# Patient Record
Sex: MALE | Race: WHITE | Employment: FULL TIME | URBAN - METROPOLITAN AREA
[De-identification: names, ages, dates, MRNs, and addresses within clinical notes are randomized per-mention and may not be internally consistent; named-entity substitution may affect disease eponyms.]

---

## 2017-01-16 RX ORDER — INSULIN GLARGINE 100 [IU]/ML
INJECTION, SOLUTION SUBCUTANEOUS
Qty: 30 ML | Refills: 0 | Status: SHIPPED | OUTPATIENT
Start: 2017-01-16 | End: 2017-03-15

## 2017-03-10 ENCOUNTER — OFFICE VISIT (OUTPATIENT)
Dept: ENDOCRINOLOGY | Facility: CLINIC | Age: 47
End: 2017-03-10
Payer: COMMERCIAL

## 2017-03-10 VITALS
DIASTOLIC BLOOD PRESSURE: 96 MMHG | HEIGHT: 68 IN | BODY MASS INDEX: 25.4 KG/M2 | WEIGHT: 167.6 LBS | SYSTOLIC BLOOD PRESSURE: 144 MMHG | HEART RATE: 93 BPM

## 2017-03-10 DIAGNOSIS — E10.65 TYPE 1 DIABETES MELLITUS WITH HYPERGLYCEMIA (H): Primary | ICD-10-CM

## 2017-03-10 DIAGNOSIS — E10.9 DIABETES MELLITUS TYPE 1 (H): ICD-10-CM

## 2017-03-10 LAB — HBA1C MFR BLD: 9.1 % (ref 0–5.7)

## 2017-03-10 PROCEDURE — 83036 HEMOGLOBIN GLYCOSYLATED A1C: CPT | Performed by: INTERNAL MEDICINE

## 2017-03-10 PROCEDURE — 36415 COLL VENOUS BLD VENIPUNCTURE: CPT | Performed by: INTERNAL MEDICINE

## 2017-03-10 PROCEDURE — 99214 OFFICE O/P EST MOD 30 MIN: CPT | Performed by: INTERNAL MEDICINE

## 2017-03-10 NOTE — LETTER
3/10/2017      RE: Carlos A Roberts  [address]  [facility] [address]       Endocrinology Clinic Visit    March 10, 2017    Chief Complaint: Diabetes       Subjective:         HPI: Carlos A Roberts is a 46 year old year old Male with history of  has a past medical history of Hyperlipidemia; Other and unspecified hyperlipidemia; Other psoriasis; Tobacco use disorder; and Type I (juvenile type) diabetes mellitus without mention of complication, not stated as uncontrolled. who is seen in  In follow up     Prior patient of Dr Gonzales     History of Diabetes  Type 1  Diagnosed in about 12 years ago. At the time he had polyuria and polydipsia.       Complications    1. Retinopathy: No Last eye exam was nearly a year ago.     2.  Nephropathy:   Lab Results   Component Value Date    MICROL 16 07/25/2016     3. Neuropathy None    4. Hypoglycemia once a month or less.     5. Macrovascular: No      Treatment  Diabetes Medication(s)     Insulin Sig    insulin glargine (LANTUS VIAL) 100 UNIT/ML injection 40 units in the monring    insulin lispro (HUMALOG KWIKPEN) 100 UNIT/ML soln Use as directed for carbohydrate and sliding scale coverage.  Max daily dose 50 units daily.        Lantus 39 units daily   Humalog 1 per 15 with 1 per 40 correction over 140    Prevention  Lipid  LDL Cholesterol Calculated   Date Value Ref Range Status   07/25/2016 156 (H) <100 mg/dL Final     Comment:     Above desirable:  100-129 mg/dl   Borderline High:  130-159 mg/dL   High:             160-189 mg/dL   Very high:       >189 mg/dl     07/22/2015 113 0 - 129 mg/dL Final     Comment:     LDL Cholesterol is the primary guide to therapy: LDL-cholesterol goal in high   risk patients is <100 mg/dL and in very high risk patients is <70 mg/dL.         Medications     HMG CoA Reductase Inhibitors    atorvastatin (LIPITOR) 80 MG tablet    Salicylates    ASPIRIN 81 MG OR TABS          Renal  Medication (Note: This includes the hypertensive combination  subclass to make sure to show all ACEI/ARB's.)     ACE Inhibitors Sig    LISINOPRIL 5 MG OR TABS ONE DAILY            Weight  Wt Readings from Last 4 Encounters:   03/10/17 76 kg (167 lb 9.6 oz)   11/28/16 75.7 kg (166 lb 14.2 oz)   07/25/16 77.4 kg (170 lb 11.2 oz)   03/28/16 78.7 kg (173 lb 8 oz)       Meter Download Summary: No meter    Diet: Does not check before meals.   Meals are erratic because of work schedule.     Exercise None    Weight trend  Wt Readings from Last 4 Encounters:   03/10/17 76 kg (167 lb 9.6 oz)   11/28/16 75.7 kg (166 lb 14.2 oz)   07/25/16 77.4 kg (170 lb 11.2 oz)   03/28/16 78.7 kg (173 lb 8 oz)       A1c today is   Lab Results   Component Value Date    A1C 9.1 03/10/2017    A1C 9.2 11/28/2016    A1C 8.8 07/25/2016    A1C 7.8 03/28/2016    A1C 7.6 01/14/2016       Barriers to achieve glycemic goals:   1. High carb diet.   2. Lack of testing  - has not checked in 6 weeks. Meter cleared by cleaning crew in the office.   3. Erratic eating schedule, does not check before meals.       Hypothyroidism  2015 : Graves disease - weight loss, sensation of warms.   WHITING ablation done in 2015  Started on LT4  Doing well  No symptoms of warmth or cold.   No change in weight or appetite. BM normal    No neck pain or problems swallowing.   No mood change -- recently  but feeling OK.          Allergies   Allergen Reactions     Penicillins        Current Outpatient Prescriptions   Medication Sig Dispense Refill     levothyroxine (SYNTHROID/LEVOTHROID) 100 MCG tablet Take 1 tablet (100 mcg) by mouth daily 90 tablet 1     insulin glargine (LANTUS VIAL) 100 UNIT/ML injection 40 units in the monring 30 mL 0     blood glucose monitoring (ACCU-CHEK JANNETH) test strip Use to test blood sugars 4 times daily or as directed. 150 each 11     insulin lispro (HUMALOG KWIKPEN) 100 UNIT/ML soln Use as directed for carbohydrate and sliding scale coverage.  Max daily dose 50 units daily. 15 mL 3     PARoxetine  "(PAXIL) 10 MG tablet Take 1 tablet (10 mg) by mouth every morning 30 tablet 0     atorvastatin (LIPITOR) 80 MG tablet Take 40 mg by mouth daily        LISINOPRIL 5 MG OR TABS ONE DAILY 1 month 0     BD INSULIN SYR ULTRAFINE II 31G X 5/16\" 0.5 ML MISC as directed 1 box prn     ASPIRIN 81 MG OR TABS ONE DAILY         Review of Systems     Constitutional: Negative for fever and unexpected weight change. Appetite Normal   Head: no headache   Eye: no vision change/ loss of peripheral vision.   ENT: No throat congestion.   Respiratory: no cough   Cardiovascular: no chest pain or tachycardia  Gastrointestinal: Negative for vomiting, abdominal pain and diarrhea.  Genitourinary: No bladder problems.  Musculoskeletal: Negative for myalgias. No weakness.  Neurological: Negative for seizures and headaches.  Psychiatric/Behavioral: Negative for behavioral problem and dysphoric mood.    Past Medical History   Diagnosis Date     Hyperlipidemia      Other and unspecified hyperlipidemia      Other psoriasis      Tobacco use disorder      Type I (juvenile type) diabetes mellitus without mention of complication, not stated as uncontrolled      Insulin dependent       Past Surgical History   Procedure Laterality Date     Appendectomy  Age 10       Family History   Problem Relation Age of Onset     HEART DISEASE Paternal Grandmother      HEART DISEASE Paternal Grandfather        Social History     Social History     Marital status:      Spouse name: N/A     Number of children: N/A     Years of education: N/A     Social History Main Topics     Smoking status: Former Smoker     Quit date: 11/25/2007     Smokeless tobacco: Not on file     Alcohol use Yes     Drug use: No     Sexual activity: Not on file     Other Topics Concern     Not on file     Social History Narrative       Objective:   BP (!) 144/96  Pulse 93  Ht 1.727 m (5' 8\")  Wt 76 kg (167 lb 9.6 oz)  BMI 25.48 kg/m2  Constitutional: Appears well-developed and " well-nourished. Active.   EYES: anicteric, normal extra-ocular movements, no lid lag or retraction   HEENT: Mouth/Throat: Mucous membrane is moist. Oropharynx is clear. No adenopathy. Normal thyroid   Cardiovascular: RRR, S1, S2 normal. No m/g/r   Pulmonary/Chest: CTAB. No wheezing or rales   Abdominal: +BS. Nontender to palpation. No organomegaly present.  Neurological: Alert. Normal affect. CNII-XII intact. Muscle strength 5/5. Sensory is intact.  Extremities: No clubbing, cyanosis or inflammation   Skin: normal texture, color  Lymphatic: no cervical lymphadenopathy.  Psychological: appropriate mood     In House Labs:   Lab Results   Component Value Date    A1C 9.1 03/10/2017    A1C 9.2 11/28/2016    A1C 8.8 07/25/2016    A1C 7.8 03/28/2016    A1C 7.6 01/14/2016       TSH   Date Value Ref Range Status   11/28/2016 2.89 0.40 - 4.00 mU/L Final   07/25/2016 7.92 (H) 0.40 - 4.00 mU/L Final   03/28/2016 2.70 0.40 - 4.00 mU/L Final   02/24/2016 3.50 0.40 - 4.00 mU/L Final   01/14/2016 5.52 (H) 0.40 - 4.00 mU/L Final     T4 Free   Date Value Ref Range Status   11/28/2016 1.20 0.76 - 1.46 ng/dL Final   07/25/2016 1.10 0.76 - 1.46 ng/dL Final   03/28/2016 1.13 0.76 - 1.46 ng/dL Final   02/24/2016 1.01 0.76 - 1.46 ng/dL Final   01/14/2016 1.03 0.76 - 1.46 ng/dL Final       Creatinine   Date Value Ref Range Status   07/25/2016 0.80 0.66 - 1.25 mg/dL Final   ]    Recent Labs   Lab Test  07/25/16   0714  07/22/15   0740 05/22/14   CHOL  253*  180  164   HDL  61  52  40   LDL  156*  113  107   TRIG  179*  76  83   CHOLHDLRATIO   --   3.5  4.10       No results found for: [id number], [id number], [id number]      Assessment/Treatment Plan:      Carlos A Roberts is a 46 year old year old male with    1. Type 1 Diabetes:    A1c is 9.1 today.  There is no evidence of chronic diabetic complications.  On statin and ARB      Barriers to achieving glycemic goals  1. High carb diet. Diet discussed.   2. Lack of testing  - has not checked  in 6 weeks. Meter cleared by cleaning crew in the office.   3. Erratic eating schedule, does not check before meals.   4. Basal BOLUS Mismatch  Humalog 1 per 10 gm carb (but may be lower than this - calculated 6.25 gm, correction calculated 1 per 25)   FU with Blanca for PUMP start.     Hypothyroidism S/P WHITING ablation  Continue LT4 100 mcg daily.       Patient Instructions   Thank you for choosing the [facility], Department of Endocrinology. It has been a pleasure servicing you today. Please contact us at [phone] with any questions. If you need to speak to an Endocrinologist and it is after 5:00 pm or on a weekend, please call the On-Call Endocrinologist at [phone].      I will contact the patient with the test results.  Return to clinic in 4 months. 2 month with Blanca for pump.     Lab a week before next visit.     Tramaine Hahn MD  [id number]  Endocrinology Service    Test and/or medications prescribed today:  Orders Placed This Encounter   Procedures     Hemoglobin A1c POCT                 Tramaine Hahn MD

## 2017-03-10 NOTE — MR AVS SNAPSHOT
After Visit Summary   3/10/2017    Carlos A Roberts    MRN: [id number]           Patient Information     Date Of Birth          1970        Visit Information        Provider Department      3/10/2017 3:20 PM Tramaine Hahn MD;  ENDO NURSE [facility]        Today's Diagnoses     Diabetes mellitus type 1 (H)    -  1      Care Instructions    Please have a follow up with Blanca and discuss about various pumps options    Continue Lantus 39 units daily    Take Humalog 1 unit per 10 gm for meals and snacks.     Correction: 1 unit per 40 mg /dl rise above 140     PLEASE test the blood sugars before meals.     Follow up 4 months. Labs prior to follow up       Thank you for choosing the [facility], Department of Endocrinology. It has been a pleasure servicing you today. Please contact us at [phone] with any questions. If you need to speak to an Endocrinologist and it is after 5:00 pm or on a weekend, please call the On-Call Endocrinologist at [phone].          Follow-ups after your visit        Follow-up notes from your care team     Return in about 4 months (around 7/10/2017).      Your next 10 appointments already scheduled     Apr 21, 2017  3:00 PM CDT   Return Visit with  Cde Provider   [facility] ([facility])    [address] [facility] [address] [phone]            Jul 14, 2017  3:00 PM CDT   LAB with LAB FIRST FLOOR [facility])    [id number] [facility] [address] [phone]           Patient must bring picture ID.  Patient should be prepared to give a urine specimen  Please do not eat 10-12 hours before your appointment if you are coming in fasting for labs on lipids, cholesterol, or glucose (sugar).  Pregnant women should follow their Care Team instructions. Water with medications is okay. Do not drink coffee  or other fluids.   If you have concerns about taking  your medications, please ask at office or if scheduling via [employer], send a message by clicking on Secure Messaging, Message Your Care Team.            Jul 21, 2017  3:15 PM CDT   Return Visit with Tramaine Hahn MD, MG ENDO NURSE   [facility] ([facility])    [address] [place] [address] [phone]              Future tests that were ordered for you today     Open Standing Orders        Priority Remaining Interval Expires Ordered    Hemoglobin A1c POCT Routine 4/5  3/10/2018 3/10/2017            Who to contact     If you have questions or need follow up information about today's clinic visit or your schedule please contact [facility] directly at [phone].  Normal or non-critical lab and imaging results will be communicated to you by [employer], letter or phone within 4 business days after the clinic has received the results. If you do not hear from us within 7 days, please contact the clinic through [employer] or phone. If you have a critical or abnormal lab result, we will notify you by phone as soon as possible.  Submit refill requests through [employer] or call your pharmacy and they will forward the refill request to us. Please allow 3 business days for your refill to be completed.          Additional Information About Your Visit        Sparks Information     Sparks gives you secure access to your electronic health record. If you see a primary care provider, you can also send messages to your care team and make appointments. If you have questions, please call your primary care clinic.  If you do not have a primary care provider, please call [phone] and they will assist you.      S[employer]ks is an electronic gateway that provides easy, online access to your medical records. With [employer], you can request a clinic appointment, read your test results, renew a prescription or  "communicate with your care team.     To access your existing account, please contact your [facility] or call [phone] for assistance.        Care EveryWhere ID     This is your Care EveryWhere ID. This could be used by other organizations to access your [place] medical records  [id number]        Your Vitals Were     Pulse Height BMI (Body Mass Index)             93 1.727 m (5' 8\") 25.48 kg/m2          Blood Pressure from Last 3 Encounters:   03/10/17 (!) 144/96   11/28/16 129/82   07/25/16 104/76    Weight from Last 3 Encounters:   03/10/17 76 kg (167 lb 9.6 oz)   11/28/16 75.7 kg (166 lb 14.2 oz)   07/25/16 77.4 kg (170 lb 11.2 oz)               Primary Care Provider Office Phone # Fax #    Sean Barron [phone]       [address] [place]  [facility] [address]        Thank you!     Thank you for choosing [facility]  for your care. Our goal is always to provide you with excellent care. Hearing back from our patients is one way we can continue to improve our services. Please take a few minutes to complete the written survey that you may receive in the mail after your visit with us. Thank you!             Your Updated Medication List - Protect others around you: Learn how to safely use, store and throw away your medicines at www.disposemymeds.org.          This list is accurate as of: 3/10/17  4:14 PM.  Always use your most recent med list.                   Brand Name Dispense Instructions for use    aspirin 81 MG tablet      ONE DAILY       atorvastatin 80 MG tablet    LIPITOR     Take 40 mg by mouth daily       B-D INSULIN SYRINGE 31G X 5/16\" 0.5 ML   Generic drug:  insulin syringe-needle U-100     1 box    as directed       blood glucose monitoring test strip    ACCU-CHEK JANNETH    150 each    Use to test blood sugars 4 times daily or as directed.       insulin glargine 100 UNIT/ML injection    LANTUS VIAL    30 mL    40 " units in the monring       insulin lispro 100 UNIT/ML injection    HumaLOG KWIKpen    15 mL    Use as directed for carbohydrate and sliding scale coverage.  Max daily dose 50 units daily.       levothyroxine 100 MCG tablet    SYNTHROID/LEVOTHROID    90 tablet    Take 1 tablet (100 mcg) by mouth daily       lisinopril 5 MG tablet    PRINIVIL/ZESTRIL    1 month    ONE DAILY       PARoxetine 10 MG tablet    PAXIL    30 tablet    Take 1 tablet (10 mg) by mouth every morning

## 2017-03-10 NOTE — PATIENT INSTRUCTIONS
Please have a follow up with Blanca and discuss about various pumps options    Continue Lantus 39 units daily    Take Humalog 1 unit per 10 gm for meals and snacks.     Correction: 1 unit per 40 mg /dl rise above 140     PLEASE test the blood sugars before meals.     Follow up 4 months. Labs prior to follow up       Thank you for choosing the [facility], Department of Endocrinology. It has been a pleasure servicing you today. Please contact us at [phone] with any questions. If you need to speak to an Endocrinologist and it is after 5:00 pm or on a weekend, please call the On-Call Endocrinologist at [phone].

## 2017-03-15 ENCOUNTER — TELEPHONE (OUTPATIENT)
Dept: ENDOCRINOLOGY | Facility: CLINIC | Age: 47
End: 2017-03-15

## 2017-03-15 DIAGNOSIS — E10.65 TYPE 1 DIABETES MELLITUS WITH HYPERGLYCEMIA (H): Primary | ICD-10-CM

## 2017-03-15 RX ORDER — INSULIN GLARGINE 100 [IU]/ML
39 INJECTION, SOLUTION SUBCUTANEOUS DAILY
Qty: 30 ML | Refills: 1 | Status: SHIPPED | OUTPATIENT
Start: 2017-03-15 | End: 2017-06-23

## 2017-03-15 NOTE — TELEPHONE ENCOUNTER
[facility] Call Center    Phone Message    Name of Caller: KENNEDY PAEZ    Phone Number: Cell number on file:    Telephone Information:   Mobile [phone]       Best time to return call: TODAY.     May a detailed message be left on voicemail: no    Relation to patient: Self    Reason for Call: Other: Pt's insurance is not covering insulin glargine (LANTUS VIAL) 100 UNIT/ML injection.  They gave him an alternative and he is wondering if it is ok to use.  Please call back.      Action Taken: Message routed to:  Adult Clinics: Endocrinology p [phone]

## 2017-03-15 NOTE — TELEPHONE ENCOUNTER
Contacted patient to review. Patient reports that his insurance now only covers Basaglar and no longer covers Lantus. Reviewed with patient that equivalent dosing for Basaglar, however it does not come in a vial. Patient ok with pens (uses pens for Humalog). Patient confirms that he is using 39 units daily.      Updated prescription sent to pharmacy. Patient will call back with any questions or concerns.       Bre Caruso RN  Endocrine Care Coordinator  [facility]

## 2017-05-05 ENCOUNTER — DOCUMENTATION ONLY (OUTPATIENT)
Dept: ENDOCRINOLOGY | Facility: CLINIC | Age: 47
End: 2017-05-05

## 2017-06-23 DIAGNOSIS — E10.65 TYPE 1 DIABETES MELLITUS WITH HYPERGLYCEMIA (H): ICD-10-CM

## 2017-06-23 RX ORDER — INSULIN GLARGINE 100 [IU]/ML
39 INJECTION, SOLUTION SUBCUTANEOUS DAILY
Qty: 45 ML | Refills: 3 | Status: SHIPPED | OUTPATIENT
Start: 2017-06-23 | End: 2018-06-27

## 2017-06-27 DIAGNOSIS — E10.9 TYPE 1 DIABETES, HBA1C GOAL < 7% (H): ICD-10-CM

## 2017-08-13 DIAGNOSIS — E89.0 POSTABLATIVE HYPOTHYROIDISM: ICD-10-CM

## 2017-08-14 RX ORDER — LEVOTHYROXINE SODIUM 100 UG/1
TABLET ORAL
Qty: 90 TABLET | Refills: 2 | Status: SHIPPED | OUTPATIENT
Start: 2017-08-14 | End: 2017-11-17

## 2017-09-08 DIAGNOSIS — E10.65 TYPE 1 DIABETES MELLITUS WITH HYPERGLYCEMIA (H): ICD-10-CM

## 2017-09-11 RX ORDER — BLOOD SUGAR DIAGNOSTIC
STRIP MISCELLANEOUS
Qty: 150 STRIP | Refills: 3 | Status: SHIPPED | OUTPATIENT
Start: 2017-09-11 | End: 2018-06-21

## 2017-09-27 ENCOUNTER — MYC MEDICAL ADVICE (OUTPATIENT)
Dept: ENDOCRINOLOGY | Facility: CLINIC | Age: 47
End: 2017-09-27

## 2017-09-27 DIAGNOSIS — E10.65 TYPE 1 DIABETES MELLITUS WITH HYPERGLYCEMIA (H): Primary | ICD-10-CM

## 2017-11-17 ENCOUNTER — OFFICE VISIT (OUTPATIENT)
Dept: ENDOCRINOLOGY | Facility: CLINIC | Age: 47
End: 2017-11-17
Payer: COMMERCIAL

## 2017-11-17 ENCOUNTER — APPOINTMENT (OUTPATIENT)
Dept: ENDOCRINOLOGY | Facility: CLINIC | Age: 47
End: 2017-11-17
Payer: COMMERCIAL

## 2017-11-17 VITALS
SYSTOLIC BLOOD PRESSURE: 142 MMHG | WEIGHT: 178.79 LBS | DIASTOLIC BLOOD PRESSURE: 93 MMHG | HEIGHT: 68 IN | BODY MASS INDEX: 27.1 KG/M2 | HEART RATE: 88 BPM

## 2017-11-17 DIAGNOSIS — E10.65 TYPE 1 DIABETES MELLITUS WITH HYPERGLYCEMIA (H): ICD-10-CM

## 2017-11-17 DIAGNOSIS — Z23 FLU VACCINE NEED: Primary | ICD-10-CM

## 2017-11-17 DIAGNOSIS — E89.0 POSTABLATIVE HYPOTHYROIDISM: ICD-10-CM

## 2017-11-17 LAB
ALT SERPL W P-5'-P-CCNC: 24 U/L (ref 0–70)
CHOLEST SERPL-MCNC: 183 MG/DL
CK SERPL-CCNC: 171 U/L (ref 30–300)
CREAT SERPL-MCNC: 0.87 MG/DL (ref 0.66–1.25)
CREAT UR-MCNC: 32 MG/DL
GFR SERPL CREATININE-BSD FRML MDRD: >90 ML/MIN/1.7M2
HBA1C MFR BLD: 7.8 % (ref 4.3–6)
HDLC SERPL-MCNC: 62 MG/DL
HGB BLD-MCNC: 15.8 G/DL (ref 13.3–17.7)
LDLC SERPL CALC-MCNC: 81 MG/DL
MICROALBUMIN UR-MCNC: <5 MG/L
MICROALBUMIN/CREAT UR: NORMAL MG/G CR (ref 0–17)
NONHDLC SERPL-MCNC: 121 MG/DL
POTASSIUM SERPL-SCNC: 3.9 MMOL/L (ref 3.4–5.3)
T4 FREE SERPL-MCNC: 1.04 NG/DL (ref 0.76–1.46)
TRIGL SERPL-MCNC: 200 MG/DL
TSH SERPL DL<=0.005 MIU/L-ACNC: 6.16 MU/L (ref 0.4–4)

## 2017-11-17 PROCEDURE — 85018 HEMOGLOBIN: CPT | Performed by: INTERNAL MEDICINE

## 2017-11-17 PROCEDURE — 99214 OFFICE O/P EST MOD 30 MIN: CPT | Mod: 25 | Performed by: INTERNAL MEDICINE

## 2017-11-17 PROCEDURE — 82565 ASSAY OF CREATININE: CPT | Performed by: INTERNAL MEDICINE

## 2017-11-17 PROCEDURE — 90686 IIV4 VACC NO PRSV 0.5 ML IM: CPT | Performed by: INTERNAL MEDICINE

## 2017-11-17 PROCEDURE — 90471 IMMUNIZATION ADMIN: CPT | Performed by: INTERNAL MEDICINE

## 2017-11-17 PROCEDURE — 80061 LIPID PANEL: CPT | Performed by: INTERNAL MEDICINE

## 2017-11-17 PROCEDURE — 36415 COLL VENOUS BLD VENIPUNCTURE: CPT | Performed by: INTERNAL MEDICINE

## 2017-11-17 PROCEDURE — 84443 ASSAY THYROID STIM HORMONE: CPT | Performed by: INTERNAL MEDICINE

## 2017-11-17 PROCEDURE — 82306 VITAMIN D 25 HYDROXY: CPT | Performed by: INTERNAL MEDICINE

## 2017-11-17 PROCEDURE — 84439 ASSAY OF FREE THYROXINE: CPT | Performed by: INTERNAL MEDICINE

## 2017-11-17 PROCEDURE — 84460 ALANINE AMINO (ALT) (SGPT): CPT | Performed by: INTERNAL MEDICINE

## 2017-11-17 PROCEDURE — 82043 UR ALBUMIN QUANTITATIVE: CPT | Performed by: INTERNAL MEDICINE

## 2017-11-17 PROCEDURE — 83516 IMMUNOASSAY NONANTIBODY: CPT | Performed by: INTERNAL MEDICINE

## 2017-11-17 PROCEDURE — 82550 ASSAY OF CK (CPK): CPT | Performed by: INTERNAL MEDICINE

## 2017-11-17 PROCEDURE — 83516 IMMUNOASSAY NONANTIBODY: CPT | Mod: 59 | Performed by: INTERNAL MEDICINE

## 2017-11-17 PROCEDURE — 84132 ASSAY OF SERUM POTASSIUM: CPT | Performed by: INTERNAL MEDICINE

## 2017-11-17 PROCEDURE — 83036 HEMOGLOBIN GLYCOSYLATED A1C: CPT | Performed by: INTERNAL MEDICINE

## 2017-11-17 RX ORDER — LEVOTHYROXINE SODIUM 100 UG/1
TABLET ORAL
Qty: 96 TABLET | Refills: 3 | Status: SHIPPED | OUTPATIENT
Start: 2017-11-17 | End: 2018-12-21

## 2017-11-17 NOTE — NURSING NOTE
"Carlos A Roberts's goals for this visit include: Follow up Diabetes  He requests these members of his care team be copied on today's visit information: YES    PCP: Sean Barron    Referring Provider:  ESTABLISHED PATIENT  No address on file    Chief Complaint   Patient presents with     Diabetes       Initial Ht 1.727 m (5' 8\")  Wt 81.1 kg (178 lb 12.7 oz)  BMI 27.19 kg/m2 Estimated body mass index is 27.19 kg/(m^2) as calculated from the following:    Height as of this encounter: 1.727 m (5' 8\").    Weight as of this encounter: 81.1 kg (178 lb 12.7 oz).  Medication Reconciliation: complete    Do you need any medication refills at today's visit? NO    "

## 2017-11-17 NOTE — PROGRESS NOTES
Endocrinology Clinic Visit    March 10, 2017    Chief Complaint: Diabetes       Subjective:         HPI: Carlos A Roberts is a 46 year old year old Male who is here for a follow-up visit     He has a past medical history of  Hyperlipidemia  Type 1 diabetes mellitus since childhood  Tobacco use disorder      Prior patient of Dr Gonzales     History of Diabetes  Type 1. Diagnosed in about 12 years ago. At the time he had polyuria and polydipsia.       Complications    1. Retinopathy: No Last eye exam was nearly a year ago.     2.  Nephropathy:   Lab Results   Component Value Date    MICROL 16 07/25/2016     3. Neuropathy None    4. Hypoglycemia once a month or less.     5. Macrovascular: No      Treatment  Diabetes Medication(s)     Insulin Sig    insulin lispro (HUMALOG KWIKPEN) 100 UNIT/ML injection Use as directed for carbohydrate and sliding scale coverage.  Max daily dose 50 units daily.    insulin glargine (BASAGLAR KWIKPEN) 100 UNIT/ML injection Inject 39 Units Subcutaneous daily Max daily dose 40 units a day        Lantus 39 units daily   Humalog 1 per 15 with 1 per 40 correction over 140    Prevention  Lipid  LDL Cholesterol Calculated   Date Value Ref Range Status   11/17/2017 PENDING <100 mg/dL Incomplete   07/25/2016 156 (H) <100 mg/dL Final     Comment:     Above desirable:  100-129 mg/dl   Borderline High:  130-159 mg/dL   High:             160-189 mg/dL   Very high:       >189 mg/dl         Medications     HMG CoA Reductase Inhibitors    atorvastatin (LIPITOR) 80 MG tablet    Salicylates    ASPIRIN 81 MG OR TABS          Renal  Medication (Note: This includes the hypertensive combination subclass to make sure to show all ACEI/ARB's.)     ACE Inhibitors Sig    LISINOPRIL 5 MG OR TABS ONE DAILY            Weight  Wt Readings from Last 4 Encounters:   11/17/17 81.1 kg (178 lb 12.7 oz)   03/10/17 76 kg (167 lb 9.6 oz)   11/28/16 75.7 kg (166 lb 14.2 oz)   07/25/16 77.4 kg (170 lb 11.2 oz)       Meter Download  Summary: No meter    Diet: Does not check before meals.   Meals are erratic because of work schedule.     Exercise None    Weight trend  Wt Readings from Last 4 Encounters:   11/17/17 81.1 kg (178 lb 12.7 oz)   03/10/17 76 kg (167 lb 9.6 oz)   11/28/16 75.7 kg (166 lb 14.2 oz)   07/25/16 77.4 kg (170 lb 11.2 oz)       A1c today is   Lab Results   Component Value Date    A1C 9.1 03/10/2017    A1C 9.2 11/28/2016    A1C 8.8 07/25/2016    A1C 7.8 03/28/2016    A1C 7.6 01/14/2016       Barriers to achieve glycemic goals:   1. High carb diet.   2. Lack of testing  - he has started to test frequently.    3. Erratic eating schedule, does not check before meals.         Hypothyroidism  2015 : Graves disease - weight loss, sensation of warms.   WHITING ablation done in 2015  Started on LT4  Doing well  No symptoms of warmth or cold.   No change in weight or appetite. BM normal    No neck pain or problems swallowing.   No mood change -- recently  but feeling OK.          Allergies   Allergen Reactions     Penicillins        Current Outpatient Prescriptions   Medication Sig Dispense Refill     insulin pen needle (B-D U/F) 31G X 8 MM Use 4-5 daily or as directed. 100 each 3     ACCU-CHEK JANNETH PLUS test strip USE TO TEST BLOOD SUGARS FOUR TIMES DAILY OR AS DIRECTED 150 strip 3     levothyroxine (SYNTHROID/LEVOTHROID) 100 MCG tablet TAKE 1 TABLET(100 MCG) BY MOUTH DAILY 90 tablet 2     insulin lispro (HUMALOG KWIKPEN) 100 UNIT/ML injection Use as directed for carbohydrate and sliding scale coverage.  Max daily dose 50 units daily. 15 mL 3     insulin glargine (BASAGLAR KWIKPEN) 100 UNIT/ML injection Inject 39 Units Subcutaneous daily Max daily dose 40 units a day 45 mL 3     PARoxetine (PAXIL) 10 MG tablet Take 1 tablet (10 mg) by mouth every morning 30 tablet 0     atorvastatin (LIPITOR) 80 MG tablet Take 40 mg by mouth daily        LISINOPRIL 5 MG OR TABS ONE DAILY 1 month 0     ASPIRIN 81 MG OR TABS ONE DAILY    "      Review of Systems     Constitutional: Negative for fever and unexpected weight change. Appetite Normal   Head: no headache   Eye: no vision change/ loss of peripheral vision.   ENT: No throat congestion.   Respiratory: no cough   Cardiovascular: no chest pain or tachycardia  Gastrointestinal: Negative for vomiting, abdominal pain and diarrhea.  Genitourinary: No bladder problems.  Musculoskeletal: Negative for myalgias. No weakness.  Neurological: Negative for seizures and headaches.  Psychiatric/Behavioral: Negative for behavioral problem and dysphoric mood.    Past Medical History:   Diagnosis Date     Hyperlipidemia      Other and unspecified hyperlipidemia      Other psoriasis      Tobacco use disorder      Type I (juvenile type) diabetes mellitus without mention of complication, not stated as uncontrolled     Insulin dependent       Past Surgical History:   Procedure Laterality Date     APPENDECTOMY  Age 10       Family History   Problem Relation Age of Onset     HEART DISEASE Paternal Grandmother      HEART DISEASE Paternal Grandfather        Social History     Social History     Marital status:      Spouse name: N/A     Number of children: N/A     Years of education: N/A     Social History Main Topics     Smoking status: Former Smoker     Quit date: 11/25/2007     Smokeless tobacco: Not on file     Alcohol use Yes     Drug use: No     Sexual activity: Not on file     Other Topics Concern     Not on file     Social History Narrative       Objective:   BP (!) 142/93  Pulse 88  Ht 1.727 m (5' 8\")  Wt 81.1 kg (178 lb 12.7 oz)  BMI 27.19 kg/m2  Constitutional: Appears well-developed and well-nourished. Active.   EYES: anicteric, normal extra-ocular movements, no lid lag or retraction   HEENT: Mouth/Throat: Mucous membrane is moist. Oropharynx is clear. No adenopathy. Normal thyroid   Cardiovascular: RRR, S1, S2 normal. No m/g/r   Pulmonary/Chest: CTAB. No wheezing or rales   Abdominal: +BS. " Nontender to palpation. No organomegaly present.  Neurological: Alert. Normal affect. CNII-XII intact. Muscle strength 5/5. Sensory is intact.  Extremities: No clubbing, cyanosis or inflammation   Skin: normal texture, color  Lymphatic: no cervical lymphadenopathy.  Psychological: appropriate mood     In House Labs:   Lab Results   Component Value Date    A1C 9.1 03/10/2017    A1C 9.2 11/28/2016    A1C 8.8 07/25/2016    A1C 7.8 03/28/2016    A1C 7.6 01/14/2016       TSH   Date Value Ref Range Status   11/28/2016 2.89 0.40 - 4.00 mU/L Final   07/25/2016 7.92 (H) 0.40 - 4.00 mU/L Final   03/28/2016 2.70 0.40 - 4.00 mU/L Final   02/24/2016 3.50 0.40 - 4.00 mU/L Final   01/14/2016 5.52 (H) 0.40 - 4.00 mU/L Final     T4 Free   Date Value Ref Range Status   11/28/2016 1.20 0.76 - 1.46 ng/dL Final   07/25/2016 1.10 0.76 - 1.46 ng/dL Final   03/28/2016 1.13 0.76 - 1.46 ng/dL Final   02/24/2016 1.01 0.76 - 1.46 ng/dL Final   01/14/2016 1.03 0.76 - 1.46 ng/dL Final       Creatinine   Date Value Ref Range Status   11/17/2017 0.87 0.66 - 1.25 mg/dL Final   ]    Recent Labs   Lab Test  07/25/16   0714  07/22/15   0740 05/22/14   CHOL  253*  180  164   HDL  61  52  40   LDL  156*  113  107   TRIG  179*  76  83   CHOLHDLRATIO   --   3.5  4.10       No results found for: [id number], [id number], [id number]    Lab Results   Component Value Date    A1C 7.8 11/17/2017    A1C 9.1 03/10/2017    A1C 9.2 11/28/2016    A1C 8.8 07/25/2016    A1C 7.8 03/28/2016      BG reports:   He checks his blood sugars 3-5 times a day.  Average blood sugar is 248 standard deviation 124, remains 39-4 99  Major patterns include  Hyperglycemia followed by hypoglycemia due to overcorrection  Prandial hyperglycemia possibly due to inadequate bolus      Assessment/Treatment Plan:      Carlos A Roberts is a 46 year old year old male with    1. Type 1 Diabetes:    A1c is 7.8 today.  There is no evidence of chronic diabetic complications.  On statin and ARB       Barriers to achieving glycemic goals  1. High carb diet. Diet discussed He has improved his eating habits  2. Lack of testing  - has made a point to frequently test   3. Erratic eating schedule, does not check before meals. Now checking frequently.    4. Basal BOLUS Mismatch  Humalog 1 per 10 gm carb (but may be lower than this - calculated 6.25 gm, correction calculated 1 per 25)   FU with Blanca for Dexcom start.  He is v interested in it.     Nocturnal Hypoglycemia, and difficulty sensing it at night.  Although he does not think he has hypoglycemia unawareness, blood sugar testing shows several episodes of blood sugars <50s   Plan to start Dexcom.    Hypothyroidism S/P WHITING ablation - inadequately replaced.   He takes it in AM with water, several hours before breakfast.   LT4 100 mcg daily for 6 days, 150 on Saturdays     I will contact the patient with the test results.  Return to clinic in 6 months, Dexcom prior to next visit. FU with Blanca     Lab a week before next visit.     Tramaine Hahn MD  [id number]  Endocrinology Service    Test and/or medications prescribed today:  Orders Placed This Encounter   Procedures     HC FLU VAC PRESRV FREE QUAD SPLIT VIR 3+YRS IM

## 2017-11-17 NOTE — LETTER
11/17/2017      RE: Carlos A Roberts  [address] [facility] [id number]     Dear Colleague,    Thank you for referring your patient, Carlos A Roberts, to the [facility]. Please see a copy of my visit note below.    Endocrinology Clinic Visit    March 10, 2017    Chief Complaint: Diabetes       Subjective:         HPI: Carlos A Roberts is a 46 year old year old Male who is here for a follow-up visit     He has a past medical history of  Hyperlipidemia  Type 1 diabetes mellitus since childhood  Tobacco use disorder      Prior patient of Dr Gonzales     History of Diabetes  Type 1. Diagnosed in about 12 years ago. At the time he had polyuria and polydipsia.       Complications    1. Retinopathy: No Last eye exam was nearly a year ago.     2.  Nephropathy:   Lab Results   Component Value Date    MICROL 16 07/25/2016     3. Neuropathy None    4. Hypoglycemia once a month or less.     5. Macrovascular: No      Treatment  Diabetes Medication(s)     Insulin Sig    insulin lispro (HUMALOG KWIKPEN) 100 UNIT/ML injection Use as directed for carbohydrate and sliding scale coverage.  Max daily dose 50 units daily.    insulin glargine (BASAGLAR KWIKPEN) 100 UNIT/ML injection Inject 39 Units Subcutaneous daily Max daily dose 40 units a day        Lantus 39 units daily   Humalog 1 per 15 with 1 per 40 correction over 140    Prevention  Lipid  LDL Cholesterol Calculated   Date Value Ref Range Status   11/17/2017 PENDING <100 mg/dL Incomplete   07/25/2016 156 (H) <100 mg/dL Final     Comment:     Above desirable:  100-129 mg/dl   Borderline High:  130-159 mg/dL   High:             160-189 mg/dL   Very high:       >189 mg/dl         Medications     HMG CoA Reductase Inhibitors    atorvastatin (LIPITOR) 80 MG tablet    Salicylates    ASPIRIN 81 MG OR TABS          Renal  Medication (Note: This includes the hypertensive combination subclass to make sure to show all ACEI/ARB's.)     ACE Inhibitors Sig     LISINOPRIL 5 MG OR TABS ONE DAILY            Weight  Wt Readings from Last 4 Encounters:   11/17/17 81.1 kg (178 lb 12.7 oz)   03/10/17 76 kg (167 lb 9.6 oz)   11/28/16 75.7 kg (166 lb 14.2 oz)   07/25/16 77.4 kg (170 lb 11.2 oz)       Meter Download Summary: No meter    Diet: Does not check before meals.   Meals are erratic because of work schedule.     Exercise None    Weight trend  Wt Readings from Last 4 Encounters:   11/17/17 81.1 kg (178 lb 12.7 oz)   03/10/17 76 kg (167 lb 9.6 oz)   11/28/16 75.7 kg (166 lb 14.2 oz)   07/25/16 77.4 kg (170 lb 11.2 oz)       A1c today is   Lab Results   Component Value Date    A1C 9.1 03/10/2017    A1C 9.2 11/28/2016    A1C 8.8 07/25/2016    A1C 7.8 03/28/2016    A1C 7.6 01/14/2016       Barriers to achieve glycemic goals:   1. High carb diet.   2. Lack of testing  - he has started to test frequently.    3. Erratic eating schedule, does not check before meals.         Hypothyroidism  2015 : Graves disease - weight loss, sensation of warms.   WHITING ablation done in 2015  Started on LT4  Doing well  No symptoms of warmth or cold.   No change in weight or appetite. BM normal    No neck pain or problems swallowing.   No mood change -- recently  but feeling OK.          Allergies   Allergen Reactions     Penicillins        Current Outpatient Prescriptions   Medication Sig Dispense Refill     insulin pen needle (B-D U/F) 31G X 8 MM Use 4-5 daily or as directed. 100 each 3     ACCU-CHEK JANNETH PLUS test strip USE TO TEST BLOOD SUGARS FOUR TIMES DAILY OR AS DIRECTED 150 strip 3     levothyroxine (SYNTHROID/LEVOTHROID) 100 MCG tablet TAKE 1 TABLET(100 MCG) BY MOUTH DAILY 90 tablet 2     insulin lispro (HUMALOG KWIKPEN) 100 UNIT/ML injection Use as directed for carbohydrate and sliding scale coverage.  Max daily dose 50 units daily. 15 mL 3     insulin glargine (BASAGLAR KWIKPEN) 100 UNIT/ML injection Inject 39 Units Subcutaneous daily Max daily dose 40 units a day 45 mL 3      "PARoxetine (PAXIL) 10 MG tablet Take 1 tablet (10 mg) by mouth every morning 30 tablet 0     atorvastatin (LIPITOR) 80 MG tablet Take 40 mg by mouth daily        LISINOPRIL 5 MG OR TABS ONE DAILY 1 month 0     ASPIRIN 81 MG OR TABS ONE DAILY         Review of Systems     Constitutional: Negative for fever and unexpected weight change. Appetite Normal   Head: no headache   Eye: no vision change/ loss of peripheral vision.   ENT: No throat congestion.   Respiratory: no cough   Cardiovascular: no chest pain or tachycardia  Gastrointestinal: Negative for vomiting, abdominal pain and diarrhea.  Genitourinary: No bladder problems.  Musculoskeletal: Negative for myalgias. No weakness.  Neurological: Negative for seizures and headaches.  Psychiatric/Behavioral: Negative for behavioral problem and dysphoric mood.    Past Medical History:   Diagnosis Date     Hyperlipidemia      Other and unspecified hyperlipidemia      Other psoriasis      Tobacco use disorder      Type I (juvenile type) diabetes mellitus without mention of complication, not stated as uncontrolled     Insulin dependent       Past Surgical History:   Procedure Laterality Date     APPENDECTOMY  Age 10       Family History   Problem Relation Age of Onset     HEART DISEASE Paternal Grandmother      HEART DISEASE Paternal Grandfather        Social History     Social History     Marital status:      Spouse name: N/A     Number of children: N/A     Years of education: N/A     Social History Main Topics     Smoking status: Former Smoker     Quit date: 11/25/2007     Smokeless tobacco: Not on file     Alcohol use Yes     Drug use: No     Sexual activity: Not on file     Other Topics Concern     Not on file     Social History Narrative       Objective:   BP (!) 142/93  Pulse 88  Ht 1.727 m (5' 8\")  Wt 81.1 kg (178 lb 12.7 oz)  BMI 27.19 kg/m2  Constitutional: Appears well-developed and well-nourished. Active.   EYES: anicteric, normal extra-ocular " movements, no lid lag or retraction   HEENT: Mouth/Throat: Mucous membrane is moist. Oropharynx is clear. No adenopathy. Normal thyroid   Cardiovascular: RRR, S1, S2 normal. No m/g/r   Pulmonary/Chest: CTAB. No wheezing or rales   Abdominal: +BS. Nontender to palpation. No organomegaly present.  Neurological: Alert. Normal affect. CNII-XII intact. Muscle strength 5/5. Sensory is intact.  Extremities: No clubbing, cyanosis or inflammation   Skin: normal texture, color  Lymphatic: no cervical lymphadenopathy.  Psychological: appropriate mood     In House Labs:   Lab Results   Component Value Date    A1C 9.1 03/10/2017    A1C 9.2 11/28/2016    A1C 8.8 07/25/2016    A1C 7.8 03/28/2016    A1C 7.6 01/14/2016       TSH   Date Value Ref Range Status   11/28/2016 2.89 0.40 - 4.00 mU/L Final   07/25/2016 7.92 (H) 0.40 - 4.00 mU/L Final   03/28/2016 2.70 0.40 - 4.00 mU/L Final   02/24/2016 3.50 0.40 - 4.00 mU/L Final   01/14/2016 5.52 (H) 0.40 - 4.00 mU/L Final     T4 Free   Date Value Ref Range Status   11/28/2016 1.20 0.76 - 1.46 ng/dL Final   07/25/2016 1.10 0.76 - 1.46 ng/dL Final   03/28/2016 1.13 0.76 - 1.46 ng/dL Final   02/24/2016 1.01 0.76 - 1.46 ng/dL Final   01/14/2016 1.03 0.76 - 1.46 ng/dL Final       Creatinine   Date Value Ref Range Status   11/17/2017 0.87 0.66 - 1.25 mg/dL Final   ]    Recent Labs   Lab Test  07/25/16   0714  07/22/15   0740 05/22/14   CHOL  253*  180  164   HDL  61  52  40   LDL  156*  113  107   TRIG  179*  76  83   CHOLHDLRATIO   --   3.5  4.10       No results found for: [id number], [id number], [id number]    Lab Results   Component Value Date    A1C 7.8 11/17/2017    A1C 9.1 03/10/2017    A1C 9.2 11/28/2016    A1C 8.8 07/25/2016    A1C 7.8 03/28/2016      BG reports:   He checks his blood sugars 3-5 times a day.  Average blood sugar is 248 standard deviation 124, remains 39-4 99  Major patterns include  Hyperglycemia followed by hypoglycemia due to overcorrection  Prandial hyperglycemia  possibly due to inadequate bolus      Assessment/Treatment Plan:      Carlos A Roberts is a 46 year old year old male with    1. Type 1 Diabetes:    A1c is 7.8 today.  There is no evidence of chronic diabetic complications.  On statin and ARB      Barriers to achieving glycemic goals  1. High carb diet. Diet discussed He has improved his eating habits  2. Lack of testing  - has made a point to frequently test   3. Erratic eating schedule, does not check before meals. Now checking frequently.    4. Basal BOLUS Mismatch  Humalog 1 per 10 gm carb (but may be lower than this - calculated 6.25 gm, correction calculated 1 per 25)   FU with Blanca for Dexcom start.  He is v interested in it.     Nocturnal Hypoglycemia, and difficulty sensing it at night.  Although he does not think he has hypoglycemia unawareness, blood sugar testing shows several episodes of blood sugars <50s   Plan to start Dexcom.    Hypothyroidism S/P WHITING ablation - inadequately replaced.   He takes it in AM with water, several hours before breakfast.   LT4 100 mcg daily for 6 days, 150 on Saturdays     I will contact the patient with the test results.  Return to clinic in 6 months, Dexcom prior to next visit. FU with Blanca     Lab a week before next visit.     Tramaine Hahn MD  [id number]  Endocrinology Service    Test and/or medications prescribed today:  Orders Placed This Encounter   Procedures     HC FLU VAC PRESRV FREE QUAD SPLIT VIR 3+YRS IM                 Again, thank you for allowing me to participate in the care of your patient.      Sincerely,    Tramaine Hahn MD

## 2017-11-17 NOTE — MR AVS SNAPSHOT
After Visit Summary   11/17/2017    Carlos A Roberts    MRN: [id number]           Patient Information     Date Of Birth          1970        Visit Information        Provider Department      11/17/2017 3:00 PM Tramaine Hahn MD [facility]        Today's Diagnoses     Flu vaccine need    -  1    Type 1 diabetes mellitus with hyperglycemia (H)          Care Instructions    Please follow up with Blanca to get a CGM - DEXCOM    Continue Basaglar 39 units dailys  Humalog 1 unit per 10 units once you start Dexcom  Humalog 1 units per 40 over 140.     --------------------------------------------------------------------------    Sending blood sugars to your provider at [facility]:  We want to help you with your diabetes management, which often requires frequent adjustments to your therapy. For your convenience, we have several ways to send your blood sugars to your doctor for review.    - Send message directly to your doctor through My Chart.  Please ask the rooming staff if you would like to sign up for My Chart.  This is a fast and confidential way to send your information and communicate directly with your provider.   - Record readings and fax to [phone].  We have a template for you to use for your convenience.  - Stop by the clinic with your meter for download if advised by provider.   - My Chart or call Blanca Beth, Diabetes Educator at [phone]  - Call the clinic and speak to one of the endocrine nurses to relay information on the telephone.  Kasey íDaz or [facility] at [phone].   -    Please call the on-call Endocrinologist at the [facility] for after       hours/weekend needs at [phone] Option #4.    Please note that you do not need to FAST if you are just having an A1C drawn. Please remember to ALWAYS bring your glucose meter with to your appointment. This data is very important for the management of your care.    Thank you!  Your [facility]  Diabetes Care Team      -----              Follow-ups after your visit        Additional Services     DIABETES EDUCATOR REFERRAL       DIABETES SELF MANAGEMENT TRAINING (DSMT)      Your provider has referred you to Diabetes Education: [facility]: [facility] Adult Diabetes Education - Internal [place] Referrals ONLY - [phone] [email or web address]    A  will call you to make your appointment. If it has been more than 3 business days since your referral was placed, please call the above phone number to schedule.    Type of training and number of hours: Previous Diagnosis: Follow-up DSMT - 2 hours.    Medicare covers: 10 hours of initial DSMT in 12 month period from the time of first visit, plus 2 hours of follow-up DSMT annually, and additional hours as requested for insulin training.    Diabetes Type: Type 1             Diabetes Co-Morbidities: none               A1C Goal:  <7.0       A1C is: Lab Results       Component                Value               Date                       A1C                      7.8                 11/17/2017              Diabetes Education Topics: Continuous Glucose Monitor: Personal CGM -- PREFER DEXCOMM.     Special Educational Needs Requiring Individual DSMT: None       MEDICAL NUTRITION THERAPY (MNT) for Diabetes    Medical Nutrition Therapy with a Registered Dietitian can be provided in coordination with Diabetes Self-Management Training to assist in achieving optimal diabetes management.    MNT Type and Hours: Do not initiate MNT at this time.                       Medicare will cover: 3 hours initial MNT in 12 month period after first visit, plus 2 hours of follow-up MNT annually    Please be aware that coverage of these services is subject to the terms and limitations of your health insurance plan.  Call member services at your health plan to determine Diabetes Self-Management Training  (Codes  &amp; ) and Medical Nutrition Therapy (Codes 99671 & 29405) benefits and ask which blood glucose monitor brands are covered by your plan.  Please bring the following with you to your appointment:    (1)  List of current medications   (2)  List of Blood Glucose Monitor brands that are covered by your insurance plan  (3)  Blood Glucose Monitor and log book  (4)   Food records for the 3 days prior to your visit    The Certified Diabetes Educator may make diabetes medication adjustments per the CDE Protocol and Collaborative Practice Agreement.                  Follow-up notes from your care team     Return in about 6 months (around 5/17/2018).      Your next 10 appointments already scheduled     Dec 11, 2017  3:00 PM CST   Return Visit with Mg Cde Provider   [facility])    [address] [place] [id number] [phone]            May 11, 2018  2:45 PM CDT   Return Visit with Tramaine Hahn MD, MG ENDO NURSE   [facility])    [address] [place] [address] [phone]              Who to contact     If you have questions or need follow up information about today's clinic visit or your schedule please contact [facility] directly at [phone].  Normal or non-critical lab and imaging results will be communicated to you by MyChart, letter or phone within 4 business days after the clinic has received the results. If you do not hear from us within 7 days, please contact the clinic through MyChart or phone. If you have a critical or abnormal lab result, we will notify you by phone as soon as possible.  Submit refill requests through [employer] or call your pharmacy and they will forward the refill request to us. Please allow 3 business days for your refill to be completed.          Additional Information About Your Visit        MyChart Information      "[employer] gives you secure access to your electronic health record. If you see a primary care provider, you can also send messages to your care team and make appointments. If you have questions, please call your primary care clinic.  If you do not have a primary care provider, please call [phone] and they will assist you.      [employer]ink[employer]Star is an electronic gateway that provides easy, online access to your medical records. With [employer], you can request a clinic appointment, read your test results, renew a prescription or communicate with your care team.     To access your existing account, please contact your [facility] or call [phone] for assistance.        Care EveryWhere ID     This is your Care EveryWhere ID. This could be used by other organizations to access your [place] medical records  [id number]        Your Vitals Were     Pulse Height BMI (Body Mass Index)             88 1.727 m (5' 8\") 27.19 kg/m2          Blood Pressure from Last 3 Encounters:   11/17/17 (!) 142/93   03/10/17 (!) 144/96   11/28/16 129/82    Weight from Last 3 Encounters:   11/17/17 81.1 kg (178 lb 12.7 oz)   03/10/17 76 kg (167 lb 9.6 oz)   11/28/16 75.7 kg (166 lb 14.2 oz)              We Performed the Following     25 Hydroxyvitamin D2 and D3     Albumin Random Urine Quantitative     ALT     CK total     Creatinine     DIABETES EDUCATOR REFERRAL     HC FLU VAC PRESRV FREE QUAD SPLIT VIR 3+YRS IM     Hemoglobin A1c     Hemoglobin     Lipid panel reflex to direct LDL     Potassium     T4 free     Tissue transglutaminase bam IgA and IgG     TSH with free T4 reflex        Primary Care Provider Office Phone # Fax #    Sean Barron [phone]       [address] [place]  [facility] [address]        Equal Access to Services     KOLE FENTON : Shania Brunner, tori reddy, raine joseph. So wa " [phone].    ATENCIÓN: Si doni randolph, tiene a delaney disposición servicios gratuitos de asistencia lingüística. Phu shah [phone].    We comply with applicable federal civil rights laws and [place] laws. We do not discriminate on the basis of race, color, national origin, age, disability, sex, sexual orientation, or gender identity.            Thank you!     Thank you for choosing [facility]  for your care. Our goal is always to provide you with excellent care. Hearing back from our patients is one way we can continue to improve our services. Please take a few minutes to complete the written survey that you may receive in the mail after your visit with us. Thank you!             Your Updated Medication List - Protect others around you: Learn how to safely use, store and throw away your medicines at www.disposemymeds.org.          This list is accurate as of: 11/17/17  3:29 PM.  Always use your most recent med list.                   Brand Name Dispense Instructions for use Diagnosis    ACCU-CHEK JANNETH PLUS test strip   Generic drug:  blood glucose monitoring     150 strip    USE TO TEST BLOOD SUGARS FOUR TIMES DAILY OR AS DIRECTED    Type 1 diabetes mellitus with hyperglycemia (H), Diabetes mellitus type 1, uncontrolled (H)       aspirin 81 MG tablet      ONE DAILY        atorvastatin 80 MG tablet    LIPITOR     Take 40 mg by mouth daily        BASAGLAR 100 UNIT/ML injection     45 mL    Inject 39 Units Subcutaneous daily Max daily dose 40 units a day    Type 1 diabetes mellitus with hyperglycemia (H)       insulin lispro 100 UNIT/ML injection    HumaLOG KWIKpen    15 mL    Use as directed for carbohydrate and sliding scale coverage.  Max daily dose 50 units daily.    Diabetes mellitus, type 2 (H)       insulin pen needle 31G X 8 MM    B-D U/F    100 each    Use 4-5 daily or as directed.    Type 1 diabetes mellitus with hyperglycemia (H)       levothyroxine 100 MCG tablet     SYNTHROID/LEVOTHROID    90 tablet    TAKE 1 TABLET(100 MCG) BY MOUTH DAILY    Postablative hypothyroidism       lisinopril 5 MG tablet    PRINIVIL/ZESTRIL    1 month    ONE DAILY    Type I (juvenile type) diabetes mellitus without mention of complication, not stated as uncontrolled       PARoxetine 10 MG tablet    PAXIL    30 tablet    Take 1 tablet (10 mg) by mouth every morning    Type I (juvenile type) diabetes mellitus without mention of complication, not stated as uncontrolled

## 2017-11-17 NOTE — PATIENT INSTRUCTIONS
Please follow up with Blanca to get a CGM - DEXCOM    Continue Basaglar 39 units dailys  Humalog 1 unit per 10 units once you start Dexcom  Humalog 1 units per 40 over 140.     --------------------------------------------------------------------------    Sending blood sugars to your provider at [facility]:  We want to help you with your diabetes management, which often requires frequent adjustments to your therapy. For your convenience, we have several ways to send your blood sugars to your doctor for review.    - Send message directly to your doctor through My Chart.  Please ask the rooming staff if you would like to sign up for My Chart.  This is a fast and confidential way to send your information and communicate directly with your provider.   - Record readings and fax to [phone].  We have a template for you to use for your convenience.  - Stop by the clinic with your meter for download if advised by provider.   - My Chart or call Blanca Beth, Diabetes Educator at [phone]  - Call the clinic and speak to one of the endocrine nurses to relay information on the telephone.  Kasey Díaz or Heavenly at [phone].   -    Please call the on-call Endocrinologist at the [facility] for after       hours/weekend needs at [phone] Option #4.    Please note that you do not need to FAST if you are just having an A1C drawn. Please remember to ALWAYS bring your glucose meter with to your appointment. This data is very important for the management of your care.    Thank you!  Your [facility] Diabetes Care Team      -----

## 2017-11-17 NOTE — NURSING NOTE

## 2017-11-20 LAB
TTG IGA SER-ACNC: 1 U/ML
TTG IGG SER-ACNC: <1 U/ML

## 2017-11-22 LAB
DEPRECATED CALCIDIOL+CALCIFEROL SERPL-MC: <17 UG/L (ref 20–75)
VITAMIN D2 SERPL-MCNC: <5 UG/L
VITAMIN D3 SERPL-MCNC: 12 UG/L

## 2017-11-30 NOTE — PROGRESS NOTES
Lab Summary.     Your vitamin D levels were low.  I recommend starting vitamin D 2000 international units daily.   This he is over-the-counter.  We can retest this in about 6 months.       I also noted mild change in TSH ( thyroid function tests) --  No need for any changes at this time.   We will monitor this in future labs..  If you have any new symptoms such as cold intolerance,  Constipation, unintentional weight gain despite poor appetite please let me know.     The remaining labs are satisfactory.  No proteins were detected in urine, kidney functions were normal.  No evidence of celiac disease..   LDL cholesterol was at goal.  Muscle and liver enzyme was normal    Best regards  Tramaine Hahn MD  [id number]  Endocrinology Service

## 2017-12-11 ENCOUNTER — OFFICE VISIT (OUTPATIENT)
Dept: NURSING | Facility: CLINIC | Age: 47
End: 2017-12-11
Payer: COMMERCIAL

## 2017-12-11 DIAGNOSIS — E11.9 DIABETES MELLITUS WITHOUT COMPLICATION (H): Primary | ICD-10-CM

## 2017-12-11 PROCEDURE — 99207 ZZC NO CHARGE LOS: CPT

## 2017-12-11 NOTE — MR AVS SNAPSHOT
After Visit Summary   12/11/2017    Carlos A Roberts    MRN: [id number]           Patient Information     Date Of Birth          1970        Visit Information        Provider Department      12/11/2017 3:00 PM Provider, Mg [facility]        Today's Diagnoses     Diabetes mellitus without complication (H)    -  1       Follow-ups after your visit        Your next 10 appointments already scheduled     Dec 18, 2017  3:00 PM CST   Return Visit with Mg Masone Provider   [facility])    [address] [address] [place] [address] [phone]            May 11, 2018  2:45 PM CDT   Return Visit with Tramaine Hahn MD,  ENDO NURSE   [facility])    [address] [address] [place] [address] [phone]              Who to contact     If you have questions or need follow up information about today's clinic visit or your schedule please contact [facility] directly at [phone].  Normal or non-critical lab and imaging results will be communicated to you by [employer], letter or phone within 4 business days after the clinic has received the results. If you do not hear from us within 7 days, please contact the clinic through [employer] or phone. If you have a critical or abnormal lab result, we will notify you by phone as soon as possible.  Submit refill requests through [employer] or call your pharmacy and they will forward the refill request to us. Please allow 3 business days for your refill to be completed.          Additional Information About Your Visit        MyChart Information     [employer] gives you secure access to your electronic health record. If you see a primary care provider, you can also send messages to your care team and make appointments. If you have questions, please call your primary care clinic.  If you do not have a primary care provider,  please call [phone] and they will assist you.      Di[employer] Mind is an electronic gateway that provides easy, online access to your medical records. With [employer], you can request a clinic appointment, read your test results, renew a prescription or communicate with your care team.     To access your existing account, please contact your [facility] or call [phone] for assistance.        Care EveryWhere ID     This is your Care EveryWhere ID. This could be used by other organizations to access your [place] medical records  [id number]         Blood Pressure from Last 3 Encounters:   11/17/17 (!) 142/93   03/10/17 (!) 144/96   11/28/16 129/82    Weight from Last 3 Encounters:   11/17/17 81.1 kg (178 lb 12.7 oz)   03/10/17 76 kg (167 lb 9.6 oz)   11/28/16 75.7 kg (166 lb 14.2 oz)              Today, you had the following     No orders found for display       Primary Care Provider Office Phone # Fax #    Sean Barron [phone]       [address] [place]  [facility] [address]        Equal Access to Services     KOLE FENTON : Hadii aad ku hadasho Soomaali, waaxda luqadaha, qaybta kaalmada adeegyada, raine ramon. So [facility] [phone].    ATENCIÓN: Si habla español, tiene a delaney disposición servicios gratuitos de asistencia lingüística. Llame al [phone].    We comply with applicable federal civil rights laws and [place] laws. We do not discriminate on the basis of race, color, national origin, age, disability, sex, sexual orientation, or gender identity.            Thank you!     Thank you for choosing [facility]  for your care. Our goal is always to provide you with excellent care. Hearing back from our patients is one way we can continue to improve our services. Please take a few minutes to complete the written survey that you may receive in the mail after your visit with us. Thank you!              Your Updated Medication List - Protect others around you: Learn how to safely use, store and throw away your medicines at www.disposemymeds.org.          This list is accurate as of: 12/11/17 11:59 PM.  Always use your most recent med list.                   Brand Name Dispense Instructions for use Diagnosis    ACCU-CHEK JANNETH PLUS test strip   Generic drug:  blood glucose monitoring     150 strip    USE TO TEST BLOOD SUGARS FOUR TIMES DAILY OR AS DIRECTED    Type 1 diabetes mellitus with hyperglycemia (H), Diabetes mellitus type 1, uncontrolled (H)       aspirin 81 MG tablet      ONE DAILY        atorvastatin 80 MG tablet    LIPITOR     Take 40 mg by mouth daily        BASAGLAR 100 UNIT/ML injection     45 mL    Inject 39 Units Subcutaneous daily Max daily dose 40 units a day    Type 1 diabetes mellitus with hyperglycemia (H)       insulin lispro 100 UNIT/ML injection    HumaLOG KWIKpen    15 mL    Use as directed for carbohydrate and sliding scale coverage.  Max daily dose 50 units daily.    Diabetes mellitus, type 2 (H)       insulin pen needle 31G X 8 MM    B-D U/F    100 each    Use 4-5 daily or as directed.    Type 1 diabetes mellitus with hyperglycemia (H)       levothyroxine 100 MCG tablet    SYNTHROID/LEVOTHROID    96 tablet    1 tab Monday thru Saturday, 1.5 tabs on Sundays    Postablative hypothyroidism       lisinopril 5 MG tablet    PRINIVIL/ZESTRIL    1 month    ONE DAILY    Type I (juvenile type) diabetes mellitus without mention of complication, not stated as uncontrolled       PARoxetine 10 MG tablet    PAXIL    30 tablet    Take 1 tablet (10 mg) by mouth every morning    Type I (juvenile type) diabetes mellitus without mention of complication, not stated as uncontrolled

## 2017-12-12 NOTE — PROGRESS NOTES
"  Diabetes Self Management Training: Placement of Dexcom sensor for 7 Day Trial    1. After 2 hours, you will be prompted to enter 2 blood sugar readings to calibrate the . Take two different samples (from different fingers). Wash your hands well before calibrating.    2. Check blood sugar once at least every 12 hours and enter it into the Dexcom  to calibrate. Checking blood sugar before meals and/or at bedtime is recommended. Blood sugar levels must be between  for the  to accept the calibration.    3. Record what you eat at meals and snacks with portion eaten. Record amount of carbohydrate grams in the Dexcom  when you eat to help track response to food intake. Record exercise type and time. Record medications you take and the time they are taken.     4. When monitor reads that \"Sensor needs to be Changed,\" go into menu function and hit \"STOP\" (NOT shutdown)    5. If battery power is low (will see indicator on monitor) and need to recharge the monitor, only charge for 1-3 hours.  DO NOT charge for more than 3 hours since it can damage monitor!    6. Avoid taking Tylenol while wearing the Dexcom, as it can cause inaccurate glucose readings.     7. Return all equipment and any food/exercise/medication logs to the [facility] on 12/18/17.     8. Follow-up appointment for review of sensor reports schedule for 12/18/17 @3pm.    Carlos A Roberts presents today for initiation of Dexcom continuous glucose monitoring, 7 Day [place], related to Type 1 diabetes.    BG values are: Not in goal  Patient's most recent   Lab Results   Component Value Date    A1C 7.8 11/17/2017    is not meeting goal of <7.0    Vitals:  There were no vitals taken for this visit.  Estimated body mass index is 27.19 kg/(m^2) as calculated from the following:    Height as of 11/17/17: 1.727 m (5' 8\").    Weight as of 11/17/17: 81.1 kg (178 lb 12.7 oz).   Last 3 BP:   BP Readings from Last 3 Encounters: "   11/17/17 (!) 142/93   03/10/17 (!) 144/96   11/28/16 129/82       History   Smoking Status     Former Smoker     Quit date: 11/25/2007   Smokeless Tobacco     Never Used       Labs:  Lab Results   Component Value Date    A1C 7.8 11/17/2017     Lab Results   Component Value Date     07/25/2016     Lab Results   Component Value Date    LDL 81 11/17/2017     HDL Cholesterol   Date Value Ref Range Status   11/17/2017 62 >39 mg/dL Final   ]  GFR Estimate   Date Value Ref Range Status   11/17/2017 >90 >60 mL/min/1.7m2 Final     Comment:     Non  GFR Calc     GFR Estimate If Black   Date Value Ref Range Status   11/17/2017 >90 >60 mL/min/1.7m2 Final     Comment:      GFR Calc     Lab Results   Component Value Date    CR 0.87 11/17/2017     Barriers to Learning Assessment: No Barriers identified    Based on learning assessment above, most appropriate setting for further diabetes education would be: Individual setting.    INTERVENTION:   Education provided today on:  Placement of Dexcom sensor - sensor placed on right side of abdomen. Pt tolerated injection of sensor well.     Pt verbalized understanding of concepts discussed and recommendations provided today.       PLAN:  See above.     FOLLOW-UP:  Return to clinic on 12/18/17 for sensor removal,download and interpretation.     Time Spent: 20 minutes - No charge    Blanca Beth RN, BSN, CDE   [facility]

## 2017-12-18 NOTE — NURSING NOTE
Patient called the day after sensor was injected into abdomen to do a 7 day trial. States he was not able to calibrate the sensor and the  never show trend graph. Pt asked to come in that same day to insert a new sensor. This was done. Same results - no bluetooth connection was made. While pt was in the office, I called [employer] to discuss issue at hand. Was advised the transmitter battery had  and was no longer working. 2nd sensor removed from pt's abdomen patient was advised when clinic receives a new transmitter, I will call him to re-do the 7 day trial. Pt was most understanding about this mishap.    Blanca Beth, RN, BSN, CDE   [facility]

## 2018-05-11 ENCOUNTER — OFFICE VISIT (OUTPATIENT)
Dept: ENDOCRINOLOGY | Facility: CLINIC | Age: 48
End: 2018-05-11
Payer: COMMERCIAL

## 2018-05-11 VITALS
HEIGHT: 68 IN | WEIGHT: 169 LBS | DIASTOLIC BLOOD PRESSURE: 96 MMHG | HEART RATE: 69 BPM | OXYGEN SATURATION: 97 % | BODY MASS INDEX: 25.61 KG/M2 | SYSTOLIC BLOOD PRESSURE: 133 MMHG

## 2018-05-11 DIAGNOSIS — E55.9 VITAMIN D DEFICIENCY: Primary | ICD-10-CM

## 2018-05-11 DIAGNOSIS — E10.65 TYPE 1 DIABETES MELLITUS WITH HYPERGLYCEMIA (H): ICD-10-CM

## 2018-05-11 LAB
ALT SERPL W P-5'-P-CCNC: 19 U/L (ref 0–70)
CK SERPL-CCNC: 35 U/L (ref 30–300)
CREAT SERPL-MCNC: 0.72 MG/DL (ref 0.66–1.25)
CREAT UR-MCNC: 202 MG/DL
GFR SERPL CREATININE-BSD FRML MDRD: >90 ML/MIN/1.7M2
HBA1C MFR BLD: 8.5 % (ref 0–5.6)
HBA1C MFR BLD: 8.7 % (ref 4.3–6)
HCT VFR BLD AUTO: 46.8 % (ref 40–53)
LDLC SERPL DIRECT ASSAY-MCNC: 107 MG/DL
MICROALBUMIN UR-MCNC: 11 MG/L
MICROALBUMIN/CREAT UR: 5.3 MG/G CR (ref 0–17)
POTASSIUM SERPL-SCNC: 3.6 MMOL/L (ref 3.4–5.3)
T4 FREE SERPL-MCNC: 1.14 NG/DL (ref 0.76–1.46)
TSH SERPL DL<=0.005 MIU/L-ACNC: 3.5 MU/L (ref 0.4–4)

## 2018-05-11 PROCEDURE — 99214 OFFICE O/P EST MOD 30 MIN: CPT | Performed by: INTERNAL MEDICINE

## 2018-05-11 PROCEDURE — 82550 ASSAY OF CK (CPK): CPT | Performed by: INTERNAL MEDICINE

## 2018-05-11 PROCEDURE — 85014 HEMATOCRIT: CPT | Performed by: INTERNAL MEDICINE

## 2018-05-11 PROCEDURE — 82565 ASSAY OF CREATININE: CPT | Performed by: INTERNAL MEDICINE

## 2018-05-11 PROCEDURE — 36415 COLL VENOUS BLD VENIPUNCTURE: CPT | Performed by: INTERNAL MEDICINE

## 2018-05-11 PROCEDURE — 84132 ASSAY OF SERUM POTASSIUM: CPT | Performed by: INTERNAL MEDICINE

## 2018-05-11 PROCEDURE — 84439 ASSAY OF FREE THYROXINE: CPT | Performed by: INTERNAL MEDICINE

## 2018-05-11 PROCEDURE — 84460 ALANINE AMINO (ALT) (SGPT): CPT | Performed by: INTERNAL MEDICINE

## 2018-05-11 PROCEDURE — 83036 HEMOGLOBIN GLYCOSYLATED A1C: CPT | Performed by: INTERNAL MEDICINE

## 2018-05-11 PROCEDURE — 84443 ASSAY THYROID STIM HORMONE: CPT | Performed by: INTERNAL MEDICINE

## 2018-05-11 PROCEDURE — 82043 UR ALBUMIN QUANTITATIVE: CPT | Performed by: INTERNAL MEDICINE

## 2018-05-11 PROCEDURE — 82306 VITAMIN D 25 HYDROXY: CPT | Performed by: INTERNAL MEDICINE

## 2018-05-11 PROCEDURE — 83721 ASSAY OF BLOOD LIPOPROTEIN: CPT | Performed by: INTERNAL MEDICINE

## 2018-05-11 NOTE — LETTER
5/11/2018         RE: Carlos A Roberts  [address] [facility], [place] [address]        Dear Colleague,    Thank you for referring your patient, Carlos A Roberts, to the [facility]. Please see a copy of my visit note below.    Endocrinology Clinic Visit      Chief Complaint: Diabetes     Summary: 46 year old male with T1DM with hyperglycemia, with postablation hypothyroidism.     Assessment/Treatment Plan:      Carlos A Roberts is a 46 year old male:     1. Type 1 Diabetes:    A1c is 8.5 today.  There is no evidence of chronic diabetic complications.  On statin and ARB      Barriers to achieving glycemic goals: Discussed that this is all about investing effort for health in distant future, legacy effects were discussed.   1. High carb diet. Diet discussed again today.   2. Lack of testing  - does not test frequently, a1c improved when he was testing often in the past.   3. Erratic eating schedule, does not check before meals. Now checking frequently.    4. Basal bolus Mismatch likely due to lack of bolusing.   Humalog 1 per 10 gm carb   Correction 1 per 30, goal is 150.   FU with Blanca for Dexcom start.  He is v interested in it.     Nocturnal Hypoglycemia, and difficulty sensing it at night.   Possible hypoglycemia unawareness,   Plan to start Dexcom.    Hypothyroidism S/P WHITING ablation - inadequately replaced.   He takes it in AM with water, several hours before breakfast.   LT4 100 mcg daily for 6 days, 150 on Saturdays     I will contact the patient with the test results.  Return to clinic in 3 months, Dexcom prior to next visit. FU with Blanca     Lab a week before next visit.     Tramaine Hahn MD  [address]  Endocrinology Service        Subjective:         HPI: Carlos A Roberts is a 46 / Male who is here for a follow-up visit     He has a past medical history of  Hyperlipidemia  Type 1 diabetes mellitus since childhood  Tobacco use disorder  Prior patient of Dr Gonzales     History of  Diabetes  Type 1. Diagnosed in about 12 years ago. At the time he had polyuria and polydipsia.       Complications    1. Retinopathy: No Last eye exam was nearly a year ago.   2.  Nephropathy:   Lab Results   Component Value Date    MICROL 16 07/25/2016     3. Neuropathy None    4. Hypoglycemia once a month or less.     5. Macrovascular: No      Treatment  Diabetes Medication(s)     Insulin Sig    insulin glargine (BASAGLAR KWIKPEN) 100 UNIT/ML injection Inject 39 Units Subcutaneous daily Max daily dose 40 units a day    insulin lispro (HUMALOG KWIKPEN) 100 UNIT/ML injection Use as directed for carbohydrate and sliding scale coverage.  Max daily dose 50 units daily.        Lantus 39 units daily   Humalog 1 per 10 with   No doing correction     Prevention  Lipid  LDL Cholesterol Calculated   Date Value Ref Range Status   11/17/2017 81 <100 mg/dL Final     Comment:     Desirable:       <100 mg/dl   07/25/2016 156 (H) <100 mg/dL Final     Comment:     Above desirable:  100-129 mg/dl   Borderline High:  130-159 mg/dL   High:             160-189 mg/dL   Very high:       >189 mg/dl         Medications     HMG CoA Reductase Inhibitors    atorvastatin (LIPITOR) 80 MG tablet    Salicylates    ASPIRIN 81 MG OR TABS          Renal  Medication (Note: This includes the hypertensive combination subclass to make sure to show all ACEI/ARB's.)     ACE Inhibitors Sig    LISINOPRIL 5 MG OR TABS ONE DAILY            Weight  Wt Readings from Last 4 Encounters:   05/11/18 76.7 kg (169 lb)   11/17/17 81.1 kg (178 lb 12.7 oz)   03/10/17 76 kg (167 lb 9.6 oz)   11/28/16 75.7 kg (166 lb 14.2 oz)       Meter Download Summary:   2 hypoglycemia episode, he detected both.   Avg 303   28 test in 30 days.       Diet: Does not check before meals.   Meals are erratic because of work schedule.     Exercise None    Weight trend  Wt Readings from Last 4 Encounters:   05/11/18 76.7 kg (169 lb)   11/17/17 81.1 kg (178 lb 12.7 oz)   03/10/17 76 kg (167 lb  9.6 oz)   11/28/16 75.7 kg (166 lb 14.2 oz)       A1c today is   Lab Results   Component Value Date    A1C 9.1 03/10/2017    A1C 9.2 11/28/2016    A1C 8.8 07/25/2016    A1C 7.8 03/28/2016    A1C 7.6 01/14/2016       Barriers to achieve glycemic goals:   1. High carb diet.   2. Lack of testing  - he has started to test frequently.    3. Erratic eating schedule, does not check before meals.         Hypothyroidism  2015 : Graves disease - weight loss, sensation of warms.   WHITING ablation done in 2015  Started on LT4  Doing well  No symptoms of warmth or cold.   No change in weight or appetite. BM normal    No neck pain or problems swallowing.   No mood change -- recently  but feeling OK.          Allergies   Allergen Reactions     Penicillins        Current Outpatient Prescriptions   Medication Sig Dispense Refill     ACCU-CHEK JANNETH PLUS test strip USE TO TEST BLOOD SUGARS FOUR TIMES DAILY OR AS DIRECTED 150 strip 3     ASPIRIN 81 MG OR TABS ONE DAILY       atorvastatin (LIPITOR) 80 MG tablet Take 40 mg by mouth daily        insulin glargine (BASAGLAR KWIKPEN) 100 UNIT/ML injection Inject 39 Units Subcutaneous daily Max daily dose 40 units a day 45 mL 3     insulin lispro (HUMALOG KWIKPEN) 100 UNIT/ML injection Use as directed for carbohydrate and sliding scale coverage.  Max daily dose 50 units daily. 15 mL 3     insulin pen needle (B-D U/F) 31G X 8 MM Use 4-5 daily or as directed. 100 each 3     levothyroxine (SYNTHROID/LEVOTHROID) 100 MCG tablet 1 tab Monday thru Saturday, 1.5 tabs on Sundays 96 tablet 3     LISINOPRIL 5 MG OR TABS ONE DAILY 1 month 0     PARoxetine (PAXIL) 10 MG tablet Take 1 tablet (10 mg) by mouth every morning 30 tablet 0       Review of Systems     Constitutional: Negative for fever and unexpected weight change. Appetite Normal   Head: no headache   Eye: no vision change/ loss of peripheral vision.   ENT: No throat congestion.   Respiratory: no cough   Cardiovascular: no chest pain or  "tachycardia  Gastrointestinal: Negative for vomiting, abdominal pain and diarrhea.  Genitourinary: No bladder problems.  Musculoskeletal: Negative for myalgias. No weakness.  Neurological: Negative for seizures and headaches.  Psychiatric/Behavioral: Negative for behavioral problem and dysphoric mood.    Past Medical History:   Diagnosis Date     Hyperlipidemia      Other and unspecified hyperlipidemia      Other psoriasis      Tobacco use disorder      Type I (juvenile type) diabetes mellitus without mention of complication, not stated as uncontrolled     Insulin dependent       Past Surgical History:   Procedure Laterality Date     APPENDECTOMY  Age 10       Family History   Problem Relation Age of Onset     HEART DISEASE Paternal Grandmother      HEART DISEASE Paternal Grandfather        Social History     Social History     Marital status:      Spouse name: N/A     Number of children: N/A     Years of education: N/A     Social History Main Topics     Smoking status: Former Smoker     Quit date: 11/25/2007     Smokeless tobacco: Not on file     Alcohol use Yes     Drug use: No     Sexual activity: Not on file     Other Topics Concern     Not on file     Social History Narrative       Objective:   BP (!) 133/96  Pulse 69  Ht 1.727 m (5' 8\")  Wt 76.7 kg (169 lb)  SpO2 97%  BMI 25.7 kg/m2  Constitutional:  Active.   EYES: anicteric, normal extra-ocular movements, no lid lag or retraction   HEENT: Mouth/Throat: Mucous membrane is moist. Oropharynx is clear. No adenopathy. Normal thyroid   Cardiovascular: RRR, S1, S2 normal. No m/g/r   Pulmonary/Chest: CTAB. No wheezing or rales   Abdominal: +BS. Nontender to palpation. No organomegaly present.  Neurological: Alert. Normal affect. CNII-XII intact. Muscle strength 5/5. Sensory is intact.  Extremities: Normal vibration and monofilament [ due 5/2019]   Skin: normal texture, color  Lymphatic: no cervical lymphadenopathy.  Psychological: appropriate mood     In " House Labs:   Lab Results   Component Value Date    A1C 9.1 03/10/2017    A1C 9.2 11/28/2016    A1C 8.8 07/25/2016    A1C 7.8 03/28/2016    A1C 7.6 01/14/2016       TSH   Date Value Ref Range Status   11/17/2017 6.16 (H) 0.40 - 4.00 mU/L Final   11/28/2016 2.89 0.40 - 4.00 mU/L Final   07/25/2016 7.92 (H) 0.40 - 4.00 mU/L Final   03/28/2016 2.70 0.40 - 4.00 mU/L Final   02/24/2016 3.50 0.40 - 4.00 mU/L Final     T4 Free   Date Value Ref Range Status   11/17/2017 1.04 0.76 - 1.46 ng/dL Final   11/28/2016 1.20 0.76 - 1.46 ng/dL Final   07/25/2016 1.10 0.76 - 1.46 ng/dL Final   03/28/2016 1.13 0.76 - 1.46 ng/dL Final   02/24/2016 1.01 0.76 - 1.46 ng/dL Final       Creatinine   Date Value Ref Range Status   11/17/2017 0.87 0.66 - 1.25 mg/dL Final   ]    Recent Labs   Lab Test  07/25/16   0714  07/22/15   0740 05/22/14   CHOL  253*  180  164   HDL  61  52  40   LDL  156*  113  107   TRIG  179*  76  83   CHOLHDLRATIO   --   3.5  4.10       Again, thank you for allowing me to participate in the care of your patient.        Sincerely,        Tramaine Hahn MD

## 2018-05-11 NOTE — NURSING NOTE
Carlos A Roberts's goals for this visit include: Follow up Diabetes  He requests these members of his care team be copied on today's visit information: YES    PCP: Sean Barron    Referring Provider:  No referring provider defined for this encounter.    There were no vitals taken for this visit.    Do you need any medication refills at today's visit? NO

## 2018-05-11 NOTE — PATIENT INSTRUCTIONS
Start Dexcom process.     [facility]-Department of Endocrinology  Blanca Beth RN, Diabetes Educator: [phone]  Clinic Nurses Bre Parks: [phone]  Clinic Fax: [phone]  On-Call Endocrine at the [place] (after hours/weekends): [phone] option 4  Scheduling Line: [phone]    Appointment Reminders:  * Please bring meter with for staff to download  * If you are due ONLY for an A1C, it is scheduled with the nurse and will be done in clinic. You do not need to schedule a lab appointment. Fasting is not required for an A1C.  * Refill request should be submitted to your pharmacy. They will contact clinic for approval.

## 2018-05-11 NOTE — MR AVS SNAPSHOT
After Visit Summary   5/11/2018    Carlos A Roberts    MRN: [id number]           Patient Information     Date Of Birth          1970        Visit Information        Provider Department      5/11/2018 2:45 PM Tramaine Hahn MD; MG ENDO NURSE [facility]        Today's Diagnoses     Vitamin D deficiency    -  1    Type 1 diabetes mellitus with hyperglycemia (H)          Care Instructions    Start Dexcom process.     [facility]-Department of Endocrinology  Blanca Beth RN, Diabetes Educator: [phone]  Clinic Nurses Bre Parks: [phone]  Clinic Fax: [phone]  On-Call Endocrine at [facility] (after hours/weekends): [phone] option 4  Scheduling Line: [phone]    Appointment Reminders:  * Please bring meter with for staff to download  * If you are due ONLY for an A1C, it is scheduled with the nurse and will be done in clinic. You do not need to schedule a lab appointment. Fasting is not required for an A1C.  * Refill request should be submitted to your pharmacy. They will contact clinic for approval.                Follow-ups after your visit        Additional Services     DIABETES EDUCATOR REFERRAL       Your provider has referred you to Diabetes Education: PREFERRED PROVIDERS:    This is a Previous Diagnosis: Follow-up DSMT - 2 hours.  Type of diabetes is Type 1   Medicare covers: 10 hours of initial DSMT in 12 month period from the time of first visit, plus 2 hours of follow-up DSMT annually, and additional hours as requested for insulin training.         Diabetes Co-Morbidities: none               A1C Goal:  <7.0       A1C is: Lab Results       Component                Value               Date                       A1C                      8.7                 05/11/2018              MEDICAL NUTRITION THERAPY (MNT) for Diabetes    Medical Nutrition Therapy with a Registered Dietitian can be provided in  coordination with Diabetes Self-Management Training to assist in achieving optimal diabetes management.    MNT Type and Hours: Do not initiate MNT at this time.                       Medicare will cover: 3 hours initial MNT in 12 month period after first visit, plus 2 hours of follow-up MNT annually                                                          A1C is: Lab Results       Component                Value               Date                       A1C                      8.7                 05/11/2018            If an urgent visit is needed or A1C is above 12, Care Team to call the diabetes education team at [phone] or send a message to the diabetes education pool (P DIAB ED-PATIENT CARE).    Diabetes education focus: Continuous Glucose Monitor: Personal CGM      Education needs: None                                                                                                                                                      Please be aware that coverage of these services is subject to the terms and limitations of your health insurance plan.  Call member services at your health plan to determine Diabetes Self-Management Training benefits and ask which blood glucose monitor brands are covered by your plan.      Please bring the following to your appointment:    -   List of current medications   -   List of blood glucose monitor brands that are covered by your insurance plan  -   Blood glucose monitor and log book  -   Food records for the 3 days prior to your visit                  Follow-up notes from your care team     Return in about 3 months (around 8/11/2018).      Your next 10 appointments already scheduled     Sep 14, 2018  3:15 PM CDT   Return Visit with Tramaine Hahn MD, MG ENDO NURSE   [facility]    [address] [place] [id number]   [phone]            Dec 07, 2018  2:45 PM CST   Return Visit with Tramaine Fuller  Sheridan aHhn MD, MG ENDO NURSE   [facility] ([facility]    [address] [address] [place] [id number] [phone]              Future tests that were ordered for you today     Open Future Orders        Priority Expected Expires Ordered    25 Hydroxyvitamin D2 and D3 Routine 5/11/2018 5/11/2019 5/11/2018    Albumin Random Urine Quantitative with Creat Ratio Routine 5/11/2018 5/11/2019 5/11/2018    ALT Routine 5/11/2018 5/11/2019 5/11/2018    Hematocrit Routine 5/11/2018 5/11/2019 5/11/2018    Hemoglobin A1c Routine 5/11/2018 5/11/2019 5/11/2018    LDL cholesterol direct Routine 5/11/2018 5/6/2019 5/11/2018    Creatinine Routine 5/11/2018 5/6/2019 5/11/2018    Potassium Routine 5/11/2018 5/6/2019 5/11/2018    CK total Routine 5/11/2018 5/11/2019 5/11/2018    T4 free Routine 5/11/2018 5/11/2019 5/11/2018    TSH Routine 5/11/2018 5/11/2019 5/11/2018            Who to contact     If you have questions or need follow up information about today's clinic visit or your schedule please contact [facility] directly at [phone].  Normal or non-critical lab and imaging results will be communicated to you by Cloudcamhart, letter or phone within 4 business days after the clinic has received the results. If you do not hear from us within 7 days, please contact the clinic through iDevicest or phone. If you have a critical or abnormal lab result, we will notify you by phone as soon as possible.  Submit refill requests through Smore or call your pharmacy and they will forward the refill request to us. Please allow 3 business days for your refill to be completed.          Additional Information About Your Visit        Smore Information     Smore gives you secure access to your electronic health record. If you see a primary care provider, you can also send messages to your care team and make appointments. If you have questions, please call your primary care clinic.  If  "you do not have a primary care provider, please call [phone] and they will assist you.      iwi is an electronic gateway that provides easy, online access to your medical records. With iwi, you can request a clinic appointment, read your test results, renew a prescription or communicate with your care team.     To access your existing account, please contact your [facility] or call [phone] for assistance.        Care EveryWhere ID     This is your Care EveryWhere ID. This could be used by other organizations to access your [place] medical records  [id number]        Your Vitals Were     Pulse Height Pulse Oximetry BMI (Body Mass Index)          69 1.727 m (5' 8\") 97% 25.7 kg/m2         Blood Pressure from Last 3 Encounters:   05/11/18 (!) 133/96   11/17/17 (!) 142/93   03/10/17 (!) 144/96    Weight from Last 3 Encounters:   05/11/18 76.7 kg (169 lb)   11/17/17 81.1 kg (178 lb 12.7 oz)   03/10/17 76 kg (167 lb 9.6 oz)              We Performed the Following     DIABETES EDUCATOR REFERRAL     Hemoglobin A1c POCT        Primary Care Provider Office Phone # Fax #    Sean Barron [phone]       [address] [place]  [facility] [address]        Equal Access to Services     KOLE FENTON : Hadii aad ku hadasho Soomaali, waaxda luqadaha, qaybta kaalmada adeegyada, raine ramon. So [facility] [phone].    ATENCIÓN: Si habla español, tiene a delaney disposición servicios gratuitos de asistencia lingüística. Llame al [phone].    We comply with applicable federal civil rights laws and [place] laws. We do not discriminate on the basis of race, color, national origin, age, disability, sex, sexual orientation, or gender identity.            Thank you!     Thank you for choosing [facility]  for your care. Our goal is always to provide you with excellent care. Hearing back from our patients is one " way we can continue to improve our services. Please take a few minutes to complete the written survey that you may receive in the mail after your visit with us. Thank you!             Your Updated Medication List - Protect others around you: Learn how to safely use, store and throw away your medicines at www.disposemymeds.org.          This list is accurate as of 5/11/18  3:32 PM.  Always use your most recent med list.                   Brand Name Dispense Instructions for use Diagnosis    ACCU-CHEK JANNETH PLUS test strip   Generic drug:  blood glucose monitoring     150 strip    USE TO TEST BLOOD SUGARS FOUR TIMES DAILY OR AS DIRECTED    Type 1 diabetes mellitus with hyperglycemia (H), Diabetes mellitus type 1, uncontrolled (H)       aspirin 81 MG tablet      ONE DAILY        atorvastatin 80 MG tablet    LIPITOR     Take 40 mg by mouth daily        BASAGLAR 100 UNIT/ML injection     45 mL    Inject 39 Units Subcutaneous daily Max daily dose 40 units a day    Type 1 diabetes mellitus with hyperglycemia (H)       insulin lispro 100 UNIT/ML injection    HumaLOG KWIKpen    15 mL    Use as directed for carbohydrate and sliding scale coverage.  Max daily dose 50 units daily.    Diabetes mellitus, type 2 (H)       insulin pen needle 31G X 8 MM    B-D U/F    100 each    Use 4-5 daily or as directed.    Type 1 diabetes mellitus with hyperglycemia (H)       levothyroxine 100 MCG tablet    SYNTHROID/LEVOTHROID    96 tablet    1 tab Monday thru Saturday, 1.5 tabs on Sundays    Postablative hypothyroidism       lisinopril 5 MG tablet    PRINIVIL/ZESTRIL    1 month    ONE DAILY    Type I (juvenile type) diabetes mellitus without mention of complication, not stated as uncontrolled       PARoxetine 10 MG tablet    PAXIL    30 tablet    Take 1 tablet (10 mg) by mouth every morning    Type I (juvenile type) diabetes mellitus without mention of complication, not stated as uncontrolled

## 2018-05-11 NOTE — PROGRESS NOTES
Endocrinology Clinic Visit      Chief Complaint: Diabetes     Summary: 46 year old male with T1DM with hyperglycemia, with postablation hypothyroidism.     Assessment/Treatment Plan:      Carlos A Roberts is a 46 year old male:     1. Type 1 Diabetes:    A1c is 8.5 today.  There is no evidence of chronic diabetic complications.  On statin and ARB      Barriers to achieving glycemic goals: Discussed that this is all about investing effort for health in distant future, legacy effects were discussed.   1. High carb diet. Diet discussed again today.   2. Lack of testing  - does not test frequently, a1c improved when he was testing often in the past.   3. Erratic eating schedule, does not check before meals. Now checking frequently.    4. Basal bolus Mismatch likely due to lack of bolusing.   Humalog 1 per 10 gm carb   Correction 1 per 30, goal is 150.   FU with Blanca for Dexcom start.  He is v interested in it.     Nocturnal Hypoglycemia, and difficulty sensing it at night.   Possible hypoglycemia unawareness,   Plan to start Dexcom.    Hypothyroidism S/P WHITING ablation - inadequately replaced.   He takes it in AM with water, several hours before breakfast.   LT4 100 mcg daily for 6 days, 150 on Saturdays     I will contact the patient with the test results.  Return to clinic in 3 months, Dexcom prior to next visit. FU with Blanca     Lab a week before next visit.     Tramaine Hahn MD  [id number]  Endocrinology Service        Subjective:         HPI: Carlos A Roberts is a 46 / Male who is here for a follow-up visit     He has a past medical history of  Hyperlipidemia  Type 1 diabetes mellitus since childhood  Tobacco use disorder  Prior patient of Dr Gonzales     History of Diabetes  Type 1. Diagnosed in about 12 years ago. At the time he had polyuria and polydipsia.       Complications    1. Retinopathy: No Last eye exam was nearly a year ago.   2.  Nephropathy:   Lab Results   Component Value Date    MICROL 16 07/25/2016      3. Neuropathy None    4. Hypoglycemia once a month or less.     5. Macrovascular: No      Treatment  Diabetes Medication(s)     Insulin Sig    insulin glargine (BASAGLAR KWIKPEN) 100 UNIT/ML injection Inject 39 Units Subcutaneous daily Max daily dose 40 units a day    insulin lispro (HUMALOG KWIKPEN) 100 UNIT/ML injection Use as directed for carbohydrate and sliding scale coverage.  Max daily dose 50 units daily.        Lantus 39 units daily   Humalog 1 per 10 with   No doing correction     Prevention  Lipid  LDL Cholesterol Calculated   Date Value Ref Range Status   11/17/2017 81 <100 mg/dL Final     Comment:     Desirable:       <100 mg/dl   07/25/2016 156 (H) <100 mg/dL Final     Comment:     Above desirable:  100-129 mg/dl   Borderline High:  130-159 mg/dL   High:             160-189 mg/dL   Very high:       >189 mg/dl         Medications     HMG CoA Reductase Inhibitors    atorvastatin (LIPITOR) 80 MG tablet    Salicylates    ASPIRIN 81 MG OR TABS          Renal  Medication (Note: This includes the hypertensive combination subclass to make sure to show all ACEI/ARB's.)     ACE Inhibitors Sig    LISINOPRIL 5 MG OR TABS ONE DAILY            Weight  Wt Readings from Last 4 Encounters:   05/11/18 76.7 kg (169 lb)   11/17/17 81.1 kg (178 lb 12.7 oz)   03/10/17 76 kg (167 lb 9.6 oz)   11/28/16 75.7 kg (166 lb 14.2 oz)       Meter Download Summary:   2 hypoglycemia episode, he detected both.   Avg 303   28 test in 30 days.       Diet: Does not check before meals.   Meals are erratic because of work schedule.     Exercise None    Weight trend  Wt Readings from Last 4 Encounters:   05/11/18 76.7 kg (169 lb)   11/17/17 81.1 kg (178 lb 12.7 oz)   03/10/17 76 kg (167 lb 9.6 oz)   11/28/16 75.7 kg (166 lb 14.2 oz)       A1c today is   Lab Results   Component Value Date    A1C 9.1 03/10/2017    A1C 9.2 11/28/2016    A1C 8.8 07/25/2016    A1C 7.8 03/28/2016    A1C 7.6 01/14/2016       Barriers to achieve glycemic goals:    1. High carb diet.   2. Lack of testing  - he has started to test frequently.    3. Erratic eating schedule, does not check before meals.         Hypothyroidism  2015 : Graves disease - weight loss, sensation of warms.   WHITING ablation done in 2015  Started on LT4  Doing well  No symptoms of warmth or cold.   No change in weight or appetite. BM normal    No neck pain or problems swallowing.   No mood change -- recently  but feeling OK.          Allergies   Allergen Reactions     Penicillins        Current Outpatient Prescriptions   Medication Sig Dispense Refill     ACCU-CHEK JANNETH PLUS test strip USE TO TEST BLOOD SUGARS FOUR TIMES DAILY OR AS DIRECTED 150 strip 3     ASPIRIN 81 MG OR TABS ONE DAILY       atorvastatin (LIPITOR) 80 MG tablet Take 40 mg by mouth daily        insulin glargine (BASAGLAR KWIKPEN) 100 UNIT/ML injection Inject 39 Units Subcutaneous daily Max daily dose 40 units a day 45 mL 3     insulin lispro (HUMALOG KWIKPEN) 100 UNIT/ML injection Use as directed for carbohydrate and sliding scale coverage.  Max daily dose 50 units daily. 15 mL 3     insulin pen needle (B-D U/F) 31G X 8 MM Use 4-5 daily or as directed. 100 each 3     levothyroxine (SYNTHROID/LEVOTHROID) 100 MCG tablet 1 tab Monday thru Saturday, 1.5 tabs on Sundays 96 tablet 3     LISINOPRIL 5 MG OR TABS ONE DAILY 1 month 0     PARoxetine (PAXIL) 10 MG tablet Take 1 tablet (10 mg) by mouth every morning 30 tablet 0       Review of Systems     Constitutional: Negative for fever and unexpected weight change. Appetite Normal   Head: no headache   Eye: no vision change/ loss of peripheral vision.   ENT: No throat congestion.   Respiratory: no cough   Cardiovascular: no chest pain or tachycardia  Gastrointestinal: Negative for vomiting, abdominal pain and diarrhea.  Genitourinary: No bladder problems.  Musculoskeletal: Negative for myalgias. No weakness.  Neurological: Negative for seizures and headaches.  Psychiatric/Behavioral:  "Negative for behavioral problem and dysphoric mood.    Past Medical History:   Diagnosis Date     Hyperlipidemia      Other and unspecified hyperlipidemia      Other psoriasis      Tobacco use disorder      Type I (juvenile type) diabetes mellitus without mention of complication, not stated as uncontrolled     Insulin dependent       Past Surgical History:   Procedure Laterality Date     APPENDECTOMY  Age 10       Family History   Problem Relation Age of Onset     HEART DISEASE Paternal Grandmother      HEART DISEASE Paternal Grandfather        Social History     Social History     Marital status:      Spouse name: N/A     Number of children: N/A     Years of education: N/A     Social History Main Topics     Smoking status: Former Smoker     Quit date: 11/25/2007     Smokeless tobacco: Not on file     Alcohol use Yes     Drug use: No     Sexual activity: Not on file     Other Topics Concern     Not on file     Social History Narrative       Objective:   BP (!) 133/96  Pulse 69  Ht 1.727 m (5' 8\")  Wt 76.7 kg (169 lb)  SpO2 97%  BMI 25.7 kg/m2  Constitutional:  Active.   EYES: anicteric, normal extra-ocular movements, no lid lag or retraction   HEENT: Mouth/Throat: Mucous membrane is moist. Oropharynx is clear. No adenopathy. Normal thyroid   Cardiovascular: RRR, S1, S2 normal. No m/g/r   Pulmonary/Chest: CTAB. No wheezing or rales   Abdominal: +BS. Nontender to palpation. No organomegaly present.  Neurological: Alert. Normal affect. CNII-XII intact. Muscle strength 5/5. Sensory is intact.  Extremities: Normal vibration and monofilament [ due 5/2019]   Skin: normal texture, color  Lymphatic: no cervical lymphadenopathy.  Psychological: appropriate mood     In House Labs:   Lab Results   Component Value Date    A1C 9.1 03/10/2017    A1C 9.2 11/28/2016    A1C 8.8 07/25/2016    A1C 7.8 03/28/2016    A1C 7.6 01/14/2016       TSH   Date Value Ref Range Status   11/17/2017 6.16 (H) 0.40 - 4.00 mU/L Final "   11/28/2016 2.89 0.40 - 4.00 mU/L Final   07/25/2016 7.92 (H) 0.40 - 4.00 mU/L Final   03/28/2016 2.70 0.40 - 4.00 mU/L Final   02/24/2016 3.50 0.40 - 4.00 mU/L Final     T4 Free   Date Value Ref Range Status   11/17/2017 1.04 0.76 - 1.46 ng/dL Final   11/28/2016 1.20 0.76 - 1.46 ng/dL Final   07/25/2016 1.10 0.76 - 1.46 ng/dL Final   03/28/2016 1.13 0.76 - 1.46 ng/dL Final   02/24/2016 1.01 0.76 - 1.46 ng/dL Final       Creatinine   Date Value Ref Range Status   11/17/2017 0.87 0.66 - 1.25 mg/dL Final   ]    Recent Labs   Lab Test  07/25/16   0714  07/22/15   0740 05/22/14   CHOL  253*  180  164   HDL  61  52  40   LDL  156*  113  107   TRIG  179*  76  83   CHOLHDLRATIO   --   3.5  4.10

## 2018-05-15 LAB
DEPRECATED CALCIDIOL+CALCIFEROL SERPL-MC: <43 UG/L (ref 20–75)
VITAMIN D2 SERPL-MCNC: <5 UG/L
VITAMIN D3 SERPL-MCNC: 38 UG/L

## 2018-05-18 ENCOUNTER — MEDICAL CORRESPONDENCE (OUTPATIENT)
Dept: HEALTH INFORMATION MANAGEMENT | Facility: CLINIC | Age: 48
End: 2018-05-18

## 2018-05-18 ENCOUNTER — DOCUMENTATION ONLY (OUTPATIENT)
Dept: ENDOCRINOLOGY | Facility: CLINIC | Age: 48
End: 2018-05-18

## 2018-05-18 NOTE — PROGRESS NOTES
Dexcom G5 CGM prescription completed and faxed to [facility] [phone].    Kasey Carrero LPN  Adult Endocrinology  [facility]

## 2018-05-18 NOTE — PROGRESS NOTES
Dear Carlos A,     Thyroid Function test was normal.  Creatinine was normal.  No proteins in the urine.  Hemoglobin A1c worse than prior values of 7.8.  Vitamin D levels were normal.  LDL cholesterol was near goal.    I have signed the paperwork for continuous glucose monitoring.  I hope that you will receive it soon and will help you to optimize blood sugar control.    With Best Regards,   Tramaine Hahn MD  Endocrinology Service.

## 2018-06-21 ENCOUNTER — MYC MEDICAL ADVICE (OUTPATIENT)
Dept: ENDOCRINOLOGY | Facility: CLINIC | Age: 48
End: 2018-06-21

## 2018-06-21 DIAGNOSIS — E10.65 TYPE 1 DIABETES MELLITUS WITH HYPERGLYCEMIA (H): ICD-10-CM

## 2018-06-27 DIAGNOSIS — E10.65 TYPE 1 DIABETES MELLITUS WITH HYPERGLYCEMIA (H): ICD-10-CM

## 2018-06-27 RX ORDER — INSULIN GLARGINE 100 [IU]/ML
39 INJECTION, SOLUTION SUBCUTANEOUS DAILY
Qty: 45 ML | Refills: 3 | Status: SHIPPED | OUTPATIENT
Start: 2018-06-27 | End: 2019-06-10

## 2018-07-02 NOTE — TELEPHONE ENCOUNTER
Form completed by Dr. Eagle and faxed to [facility].    Kasey Carrero LPN  Adult Endocrinology  [facility]

## 2018-07-17 ENCOUNTER — MYC MEDICAL ADVICE (OUTPATIENT)
Dept: ENDOCRINOLOGY | Facility: CLINIC | Age: 48
End: 2018-07-17

## 2018-07-17 DIAGNOSIS — E11.9 DIABETES MELLITUS, TYPE 2 (H): ICD-10-CM

## 2018-07-27 ENCOUNTER — TELEPHONE (OUTPATIENT)
Dept: ENDOCRINOLOGY | Facility: CLINIC | Age: 48
End: 2018-07-27

## 2018-09-11 ENCOUNTER — MYC MEDICAL ADVICE (OUTPATIENT)
Dept: ENDOCRINOLOGY | Facility: CLINIC | Age: 48
End: 2018-09-11

## 2018-12-21 ENCOUNTER — MYC MEDICAL ADVICE (OUTPATIENT)
Dept: ENDOCRINOLOGY | Facility: CLINIC | Age: 48
End: 2018-12-21

## 2018-12-21 ENCOUNTER — MYC REFILL (OUTPATIENT)
Dept: ENDOCRINOLOGY | Facility: CLINIC | Age: 48
End: 2018-12-21

## 2018-12-21 DIAGNOSIS — E89.0 POSTABLATIVE HYPOTHYROIDISM: ICD-10-CM

## 2018-12-21 RX ORDER — LEVOTHYROXINE SODIUM 100 UG/1
TABLET ORAL
Qty: 96 TABLET | Refills: 1 | Status: SHIPPED | OUTPATIENT
Start: 2018-12-21 | End: 2019-07-16

## 2019-03-01 ENCOUNTER — MYC MEDICAL ADVICE (OUTPATIENT)
Dept: ENDOCRINOLOGY | Facility: CLINIC | Age: 49
End: 2019-03-01

## 2019-03-01 ENCOUNTER — OFFICE VISIT (OUTPATIENT)
Dept: ENDOCRINOLOGY | Facility: CLINIC | Age: 49
End: 2019-03-01
Payer: COMMERCIAL

## 2019-03-01 VITALS
WEIGHT: 174.9 LBS | SYSTOLIC BLOOD PRESSURE: 144 MMHG | BODY MASS INDEX: 26.51 KG/M2 | DIASTOLIC BLOOD PRESSURE: 98 MMHG | OXYGEN SATURATION: 96 % | HEIGHT: 68 IN | HEART RATE: 78 BPM

## 2019-03-01 DIAGNOSIS — E10.65 TYPE 1 DIABETES MELLITUS WITH HYPERGLYCEMIA (H): ICD-10-CM

## 2019-03-01 LAB
ALBUMIN SERPL-MCNC: 3.9 G/DL (ref 3.4–5)
ALT SERPL W P-5'-P-CCNC: 19 U/L (ref 0–70)
ANION GAP SERPL CALCULATED.3IONS-SCNC: 5 MMOL/L (ref 3–14)
BUN SERPL-MCNC: 11 MG/DL (ref 7–30)
CALCIUM SERPL-MCNC: 9.7 MG/DL (ref 8.5–10.1)
CHLORIDE SERPL-SCNC: 104 MMOL/L (ref 94–109)
CK SERPL-CCNC: 34 U/L (ref 30–300)
CO2 SERPL-SCNC: 28 MMOL/L (ref 20–32)
CREAT SERPL-MCNC: 0.68 MG/DL (ref 0.66–1.25)
CREAT UR-MCNC: 137 MG/DL
GFR SERPL CREATININE-BSD FRML MDRD: >90 ML/MIN/{1.73_M2}
GLUCOSE SERPL-MCNC: 175 MG/DL (ref 70–99)
HBA1C MFR BLD: 8.2 % (ref 0–5.6)
HCT VFR BLD AUTO: 47 % (ref 40–53)
LDLC SERPL DIRECT ASSAY-MCNC: 162 MG/DL
MICROALBUMIN UR-MCNC: 8 MG/L
MICROALBUMIN/CREAT UR: 5.61 MG/G CR (ref 0–17)
PHOSPHATE SERPL-MCNC: 2.8 MG/DL (ref 2.5–4.5)
POTASSIUM SERPL-SCNC: 4 MMOL/L (ref 3.4–5.3)
SODIUM SERPL-SCNC: 137 MMOL/L (ref 133–144)
TSH SERPL DL<=0.005 MIU/L-ACNC: 3.05 MU/L (ref 0.4–4)

## 2019-03-01 PROCEDURE — 84460 ALANINE AMINO (ALT) (SGPT): CPT | Performed by: INTERNAL MEDICINE

## 2019-03-01 PROCEDURE — 80069 RENAL FUNCTION PANEL: CPT | Performed by: INTERNAL MEDICINE

## 2019-03-01 PROCEDURE — 83721 ASSAY OF BLOOD LIPOPROTEIN: CPT | Performed by: INTERNAL MEDICINE

## 2019-03-01 PROCEDURE — 82550 ASSAY OF CK (CPK): CPT | Performed by: INTERNAL MEDICINE

## 2019-03-01 PROCEDURE — 99214 OFFICE O/P EST MOD 30 MIN: CPT | Performed by: INTERNAL MEDICINE

## 2019-03-01 PROCEDURE — 85014 HEMATOCRIT: CPT | Performed by: INTERNAL MEDICINE

## 2019-03-01 PROCEDURE — 84443 ASSAY THYROID STIM HORMONE: CPT | Performed by: INTERNAL MEDICINE

## 2019-03-01 PROCEDURE — 83036 HEMOGLOBIN GLYCOSYLATED A1C: CPT | Performed by: INTERNAL MEDICINE

## 2019-03-01 PROCEDURE — 36415 COLL VENOUS BLD VENIPUNCTURE: CPT | Performed by: INTERNAL MEDICINE

## 2019-03-01 PROCEDURE — 82043 UR ALBUMIN QUANTITATIVE: CPT | Performed by: INTERNAL MEDICINE

## 2019-03-01 RX ORDER — FLASH GLUCOSE SCANNING READER
1 EACH MISCELLANEOUS CONTINUOUS
Qty: 1 DEVICE | Refills: 0 | Status: SHIPPED | OUTPATIENT
Start: 2019-03-01 | End: 2020-08-18

## 2019-03-01 RX ORDER — FLASH GLUCOSE SENSOR
1 KIT MISCELLANEOUS CONTINUOUS
Qty: 6 EACH | Refills: 3 | Status: SHIPPED | OUTPATIENT
Start: 2019-03-01 | End: 2020-02-21

## 2019-03-01 ASSESSMENT — MIFFLIN-ST. JEOR: SCORE: 1634.59

## 2019-03-01 NOTE — RESULT ENCOUNTER NOTE
Isaiah Strauss,     Her is the result of the testing done today.   LDL cholesterol is high, we will repeat it in future visit once sugars are better controlled.   Your hemoglobin and hemoglobin A1c are stable.  A1c is higher than our goal as discussed today. Kidney functions are stable.  Liver enzymes were normal.  Thyroid labs are unremarkable.    Continue with efforts to getting sensor as planned and report with results.     With Best Regards,   Tramaine Hahn MD  Endocrinology Service.

## 2019-03-01 NOTE — NURSING NOTE
"Carlos A Roberts's goals for this visit include:   Chief Complaint   Patient presents with     RECHECK     Diabetes     He requests these members of his care team be copied on today's visit information: Yes    PCP: Sean Barron    Referring Provider:  Sean Barron MD  [address]  [place] [address]    BP (!) 144/98 (BP Location: Left arm, Patient Position: Sitting, Cuff Size: Adult Regular)   Pulse 78   Ht 1.73 m (5' 8.11\")   Wt 79.3 kg (174 lb 14.4 oz)   SpO2 96%   BMI 26.51 kg/m      Do you need any medication refills at today's visit? No    "

## 2019-03-01 NOTE — PATIENT INSTRUCTIONS
Reina sensor use.   Continue current regimen.   Reina download in a few weeks after using it.   Labs today.     [facility]-Department of Endocrinology  Blanca Beth RN, Diabetes Educator: [phone]  Clinic Nurses Bre Parks: [phone]  Clinic Fax: [phone]  On-Call Endocrine at the [place] (after hours/weekends): [phone] option 4  Scheduling Line: [phone]    Appointment Reminders:  * Please bring meter with for staff to download  * If you are due ONLY for an A1C, it is scheduled with the nurse and will be done in clinic. You do not need to schedule a lab appointment. Fasting is not required for an A1C.  * Refill request should be submitted to your pharmacy. They will contact clinic for approval.

## 2019-03-01 NOTE — PROGRESS NOTES
Endocrinology Clinic Visit      03/01/2019    Chief Complaint: RECHECK and Diabetes     Interval history:   Dexcom cost was $180 / month and had trouble getting it.   No lows but testing infrequently, treating symptoms of low even before testing.   Feels most lows during day and night.   Wakes up once at night to urinate.   Poor sleep quality, no problems with sleep onset.   Work schedule does not permit frequent testing.   Often test is done post meal. Therefore difficult to assess the validity of the testing.   Many times, misses insulin dose premeal.     BG testing: Avg 269   SD 93  Test per day 1.4  Range   Most values are >250, no low at night   On value of 64 that happened during an afternoon, does not recall the events.     Assessment/Treatment Plan:      Carlos A Roberts is a 46 year old male:     1. Type 1 Diabetes with hyperglycemia:    A1c is 8.2.  There is no evidence of chronic diabetic complications.  On statin and ARB      Barriers to achieving glycemic goals:  1. High carb diet. Diet discussed, trying to make changes.   2. Lack of testing  - does not test frequently, a1c improved when he was testing often in the past. Use Reina   3. Erratic eating schedule due to work, does not check before meals.    4. Basal bolus Mismatch likely due to lack of bolusing.   Humalog 1 per 10 gm carb   Correction 1 per 30, goal is 150.     Nocturnal Hypoglycemia, and difficulty sensing it at night.   Possible hypoglycemia unawareness,   Plan to start Dexcom.    Hypothyroidism S/P WHITING ablation - inadequately replaced.   He takes it in AM with water, several hours before breakfast.   LT4 100 mcg daily for 6 days, 150 on Saturdays     I will contact the patient with the test results.    Lab today     Tramaine Hahn MD  [id number]  Endocrinology Service      =====================================================  HPI: Carlos A Roberts is 48 year old / Male who is here for a follow-up visit     He has a past medical  history of  Hyperlipidemia  Type 1 diabetes mellitus since childhood  Tobacco use disorder  Prior patient of Dr Gonzales     History of Diabetes  Type 1. Diagnosed in about 12 years ago. At the time he had polyuria and polydipsia.       Complications    1. Retinopathy: No Last eye exam was nearly a year ago.   2. Nephropathy: ordered  3. Neuropathy None  4. Hypoglycemia once a month or less.   5. Macrovascular: No      Treatment  Diabetes Medication(s)     Insulin       BASAGLAR 100 UNIT/ML injection    Inject 39 Units Subcutaneous daily Max daily dose 40 units a day     insulin lispro (HUMALOG KWIKPEN) 100 UNIT/ML injection    Use as directed for carbohydrate and sliding scale coverage.  Max daily dose 50 units daily.        Lantus 39 units daily   Humalog 1 per 10 with   Not doing correction     Prevention  Lipid  LDL Cholesterol Calculated   Date Value Ref Range Status   11/17/2017 81 <100 mg/dL Final     Comment:     Desirable:       <100 mg/dl   07/25/2016 156 (H) <100 mg/dL Final     Comment:     Above desirable:  100-129 mg/dl   Borderline High:  130-159 mg/dL   High:             160-189 mg/dL   Very high:       >189 mg/dl       LDL Cholesterol Direct   Date Value Ref Range Status   05/11/2018 107 (H) <100 mg/dL Final     Comment:     Above desirable:  100-129 mg/dl  Borderline High:  130-159 mg/dL  High:             160-189 mg/dL  Very high:       >189 mg/dl         Medications     HMG CoA Reductase Inhibitors     atorvastatin (LIPITOR) 80 MG tablet       Salicylates     ASPIRIN 81 MG OR TABS             Renal  Medication (Note: This includes the hypertensive combination subclass to make sure to show all ACEI/ARB's.)     ACE Inhibitors       LISINOPRIL 5 MG OR TABS    ONE DAILY            Weight  Wt Readings from Last 4 Encounters:   03/01/19 79.3 kg (174 lb 14.4 oz)   05/11/18 76.7 kg (169 lb)   11/17/17 81.1 kg (178 lb 12.7 oz)   03/10/17 76 kg (167 lb 9.6 oz)       Meter Download Summary:   2 hypoglycemia  episode, he detected both.   Avg 303   28 test in 30 days.       Diet: Does not check before meals.   Meals are erratic because of work schedule.     Exercise None    Weight trend  Wt Readings from Last 4 Encounters:   03/01/19 79.3 kg (174 lb 14.4 oz)   05/11/18 76.7 kg (169 lb)   11/17/17 81.1 kg (178 lb 12.7 oz)   03/10/17 76 kg (167 lb 9.6 oz)       A1c today is   Lab Results   Component Value Date    A1C 9.1 03/10/2017    A1C 9.2 11/28/2016    A1C 8.8 07/25/2016    A1C 7.8 03/28/2016    A1C 7.6 01/14/2016       Barriers to achieve glycemic goals:   1. High carb diet.   2. Lack of testing  - he has started to test frequently.    3. Erratic eating schedule, does not check before meals.         Hypothyroidism  2015 : Graves disease - weight loss, sensation of warms.   WHITING ablation done in 2015  Started on LT4  Doing well  No symptoms of warmth or cold.   No change in weight or appetite. BM normal    No neck pain or problems swallowing.   No mood change -- recently  but feeling OK.          Allergies   Allergen Reactions     Penicillins        Current Outpatient Medications   Medication Sig Dispense Refill     atorvastatin (LIPITOR) 80 MG tablet Take 40 mg by mouth daily        BASAGLAR 100 UNIT/ML injection Inject 39 Units Subcutaneous daily Max daily dose 40 units a day 45 mL 3     blood glucose monitoring (ACCU-CHEK JANNETH PLUS) test strip USE TO TEST BLOOD SUGARS FOUR TIMES DAILY OR AS DIRECTED 400 strip 3     insulin lispro (HUMALOG KWIKPEN) 100 UNIT/ML injection Use as directed for carbohydrate and sliding scale coverage.  Max daily dose 50 units daily. 15 mL 3     insulin pen needle (B-D U/F) 31G X 8 MM Use 4-5 daily or as directed. 100 each 3     levothyroxine (SYNTHROID/LEVOTHROID) 100 MCG tablet 1 tab Monday thru Saturday, 1.5 tabs on Sundays 96 tablet 1     LISINOPRIL 5 MG OR TABS ONE DAILY 1 month 0     PARoxetine (PAXIL) 10 MG tablet Take 1 tablet (10 mg) by mouth every morning 30 tablet 0      "ASPIRIN 81 MG OR TABS ONE DAILY         Review of Systems     Constitutional: Negative for fever and unexpected weight change. Appetite Normal   Head: no headache   Eye: no vision change/ loss of peripheral vision.   ENT: No throat congestion.   Respiratory: no cough   Cardiovascular: no chest pain or tachycardia  Gastrointestinal: Negative for vomiting, abdominal pain and diarrhea.  Genitourinary: No bladder problems.  Musculoskeletal: Negative for myalgias. No weakness.  Neurological: Negative for seizures and headaches.  Psychiatric/Behavioral: Negative for behavioral problem and dysphoric mood.    Past Medical History:   Diagnosis Date     Hyperlipidemia      Other and unspecified hyperlipidemia      Other psoriasis      Tobacco use disorder      Type I (juvenile type) diabetes mellitus without mention of complication, not stated as uncontrolled     Insulin dependent       Past Surgical History:   Procedure Laterality Date     APPENDECTOMY  Age 10       Family History   Problem Relation Age of Onset     Heart Disease Paternal Grandmother      Heart Disease Paternal Grandfather        Social History     Social History     Marital status:      Spouse name: N/A     Number of children: N/A     Years of education: N/A     Social History Main Topics     Smoking status: Former Smoker     Quit date: 11/25/2007     Smokeless tobacco: Not on file     Alcohol use Yes     Drug use: No     Sexual activity: Not on file     Other Topics Concern     Not on file     Social History Narrative       Objective:   BP (!) 144/98 (BP Location: Left arm, Patient Position: Sitting, Cuff Size: Adult Regular)   Pulse 78   Ht 1.73 m (5' 8.11\")   Wt 79.3 kg (174 lb 14.4 oz)   SpO2 96%   BMI 26.51 kg/m    Constitutional:  Active.   EYES: anicteric, normal extra-ocular movements, no lid lag or retraction   HEENT: Mouth/Throat: Mucous membrane is moist. Oropharynx is clear. No adenopathy. Normal thyroid   Cardiovascular: RRR, S1, S2 " normal. No m/g/r   Pulmonary/Chest: CTAB. No wheezing or rales   Abdominal: +BS. Nontender to palpation. No organomegaly present.  Neurological: Alert. Normal speech and gait.   Extremities: Normal vibration and monofilament [ due 5/2019]   Skin: normal texture, color  Lymphatic: no cervical lymphadenopathy.  Psychological: appropriate mood     In House Labs:   Lab Results   Component Value Date    A1C 9.1 03/10/2017    A1C 9.2 11/28/2016    A1C 8.8 07/25/2016    A1C 7.8 03/28/2016    A1C 7.6 01/14/2016       TSH   Date Value Ref Range Status   05/11/2018 3.50 0.40 - 4.00 mU/L Final   11/17/2017 6.16 (H) 0.40 - 4.00 mU/L Final   11/28/2016 2.89 0.40 - 4.00 mU/L Final   07/25/2016 7.92 (H) 0.40 - 4.00 mU/L Final   03/28/2016 2.70 0.40 - 4.00 mU/L Final     T4 Free   Date Value Ref Range Status   05/11/2018 1.14 0.76 - 1.46 ng/dL Final   11/17/2017 1.04 0.76 - 1.46 ng/dL Final   11/28/2016 1.20 0.76 - 1.46 ng/dL Final   07/25/2016 1.10 0.76 - 1.46 ng/dL Final   03/28/2016 1.13 0.76 - 1.46 ng/dL Final       Creatinine   Date Value Ref Range Status   05/11/2018 0.72 0.66 - 1.25 mg/dL Final   ]    Recent Labs   Lab Test  07/25/16   0714  07/22/15   0740 05/22/14   CHOL  253*  180  164   HDL  61  52  40   LDL  156*  113  107   TRIG  179*  76  83   CHOLHDLRATIO   --   3.5  4.10

## 2019-03-01 NOTE — LETTER
3/1/2019         RE: Carlos A Roberts  [address] [facility] [address]        Dear Colleague,    Thank you for referring your patient, Carlos A Roberts, to the [facility]. Please see a copy of my visit note below.    Endocrinology Clinic Visit      03/01/2019    Chief Complaint: RECHECK and Diabetes     Interval history:   Dexcom cost was $180 / month and had trouble getting it.   No lows but testing infrequently, treating symptoms of low even before testing.   Feels most lows during day and night.   Wakes up once at night to urinate.   Poor sleep quality, no problems with sleep onset.   Work schedule does not permit frequent testing.   Often test is done post meal. Therefore difficult to assess the validity of the testing.   Many times, misses insulin dose premeal.     BG testing: Avg 269   SD 93  Test per day 1.4  Range   Most values are >250, no low at night   On value of 64 that happened during an afternoon, does not recall the events.     Assessment/Treatment Plan:      Carlos A Roberts is a 46 year old male:     1. Type 1 Diabetes with hyperglycemia:    A1c is 8.2.  There is no evidence of chronic diabetic complications.  On statin and ARB      Barriers to achieving glycemic goals:  1. High carb diet. Diet discussed, trying to make changes.   2. Lack of testing  - does not test frequently, a1c improved when he was testing often in the past. Use Reina   3. Erratic eating schedule due to work, does not check before meals.    4. Basal bolus Mismatch likely due to lack of bolusing.   Humalog 1 per 10 gm carb   Correction 1 per 30, goal is 150.     Nocturnal Hypoglycemia, and difficulty sensing it at night.   Possible hypoglycemia unawareness,   Plan to start Dexcom.    Hypothyroidism S/P WHITING ablation - inadequately replaced.   He takes it in AM with water, several hours before breakfast.   LT4 100 mcg daily for 6 days, 150 on Saturdays     I will contact the patient with the  test results.    Lab today     Tramaine Hahn MD  [id number]  Endocrinology Service      =====================================================  HPI: Carlos A Roberts is 48 year old / Male who is here for a follow-up visit     He has a past medical history of  Hyperlipidemia  Type 1 diabetes mellitus since childhood  Tobacco use disorder  Prior patient of Dr Gonzales     History of Diabetes  Type 1. Diagnosed in about 12 years ago. At the time he had polyuria and polydipsia.       Complications    1. Retinopathy: No Last eye exam was nearly a year ago.   2. Nephropathy: ordered  3. Neuropathy None  4. Hypoglycemia once a month or less.   5. Macrovascular: No      Treatment  Diabetes Medication(s)     Insulin       BASAGLAR 100 UNIT/ML injection    Inject 39 Units Subcutaneous daily Max daily dose 40 units a day     insulin lispro (HUMALOG KWIKPEN) 100 UNIT/ML injection    Use as directed for carbohydrate and sliding scale coverage.  Max daily dose 50 units daily.        Lantus 39 units daily   Humalog 1 per 10 with   Not doing correction     Prevention  Lipid  LDL Cholesterol Calculated   Date Value Ref Range Status   11/17/2017 81 <100 mg/dL Final     Comment:     Desirable:       <100 mg/dl   07/25/2016 156 (H) <100 mg/dL Final     Comment:     Above desirable:  100-129 mg/dl   Borderline High:  130-159 mg/dL   High:             160-189 mg/dL   Very high:       >189 mg/dl       LDL Cholesterol Direct   Date Value Ref Range Status   05/11/2018 107 (H) <100 mg/dL Final     Comment:     Above desirable:  100-129 mg/dl  Borderline High:  130-159 mg/dL  High:             160-189 mg/dL  Very high:       >189 mg/dl         Medications     HMG CoA Reductase Inhibitors     atorvastatin (LIPITOR) 80 MG tablet       Salicylates     ASPIRIN 81 MG OR TABS             Renal  Medication (Note: This includes the hypertensive combination subclass to make sure to show all ACEI/ARB's.)     ACE Inhibitors       LISINOPRIL 5 MG OR TABS     ONE DAILY            Weight  Wt Readings from Last 4 Encounters:   03/01/19 79.3 kg (174 lb 14.4 oz)   05/11/18 76.7 kg (169 lb)   11/17/17 81.1 kg (178 lb 12.7 oz)   03/10/17 76 kg (167 lb 9.6 oz)       Meter Download Summary:   2 hypoglycemia episode, he detected both.   Avg 303   28 test in 30 days.       Diet: Does not check before meals.   Meals are erratic because of work schedule.     Exercise None    Weight trend  Wt Readings from Last 4 Encounters:   03/01/19 79.3 kg (174 lb 14.4 oz)   05/11/18 76.7 kg (169 lb)   11/17/17 81.1 kg (178 lb 12.7 oz)   03/10/17 76 kg (167 lb 9.6 oz)       A1c today is   Lab Results   Component Value Date    A1C 9.1 03/10/2017    A1C 9.2 11/28/2016    A1C 8.8 07/25/2016    A1C 7.8 03/28/2016    A1C 7.6 01/14/2016       Barriers to achieve glycemic goals:   1. High carb diet.   2. Lack of testing  - he has started to test frequently.    3. Erratic eating schedule, does not check before meals.         Hypothyroidism  2015 : Graves disease - weight loss, sensation of warms.   WHITING ablation done in 2015  Started on LT4  Doing well  No symptoms of warmth or cold.   No change in weight or appetite. BM normal    No neck pain or problems swallowing.   No mood change -- recently  but feeling OK.          Allergies   Allergen Reactions     Penicillins        Current Outpatient Medications   Medication Sig Dispense Refill     atorvastatin (LIPITOR) 80 MG tablet Take 40 mg by mouth daily        BASAGLAR 100 UNIT/ML injection Inject 39 Units Subcutaneous daily Max daily dose 40 units a day 45 mL 3     blood glucose monitoring (ACCU-CHEK JANNETH PLUS) test strip USE TO TEST BLOOD SUGARS FOUR TIMES DAILY OR AS DIRECTED 400 strip 3     insulin lispro (HUMALOG KWIKPEN) 100 UNIT/ML injection Use as directed for carbohydrate and sliding scale coverage.  Max daily dose 50 units daily. 15 mL 3     insulin pen needle (B-D U/F) 31G X 8 MM Use 4-5 daily or as directed. 100 each 3     levothyroxine  "(SYNTHROID/LEVOTHROID) 100 MCG tablet 1 tab Monday thru Saturday, 1.5 tabs on Sundays 96 tablet 1     LISINOPRIL 5 MG OR TABS ONE DAILY 1 month 0     PARoxetine (PAXIL) 10 MG tablet Take 1 tablet (10 mg) by mouth every morning 30 tablet 0     ASPIRIN 81 MG OR TABS ONE DAILY         Review of Systems     Constitutional: Negative for fever and unexpected weight change. Appetite Normal   Head: no headache   Eye: no vision change/ loss of peripheral vision.   ENT: No throat congestion.   Respiratory: no cough   Cardiovascular: no chest pain or tachycardia  Gastrointestinal: Negative for vomiting, abdominal pain and diarrhea.  Genitourinary: No bladder problems.  Musculoskeletal: Negative for myalgias. No weakness.  Neurological: Negative for seizures and headaches.  Psychiatric/Behavioral: Negative for behavioral problem and dysphoric mood.    Past Medical History:   Diagnosis Date     Hyperlipidemia      Other and unspecified hyperlipidemia      Other psoriasis      Tobacco use disorder      Type I (juvenile type) diabetes mellitus without mention of complication, not stated as uncontrolled     Insulin dependent       Past Surgical History:   Procedure Laterality Date     APPENDECTOMY  Age 10       Family History   Problem Relation Age of Onset     Heart Disease Paternal Grandmother      Heart Disease Paternal Grandfather        Social History     Social History     Marital status:      Spouse name: N/A     Number of children: N/A     Years of education: N/A     Social History Main Topics     Smoking status: Former Smoker     Quit date: 11/25/2007     Smokeless tobacco: Not on file     Alcohol use Yes     Drug use: No     Sexual activity: Not on file     Other Topics Concern     Not on file     Social History Narrative       Objective:   BP (!) 144/98 (BP Location: Left arm, Patient Position: Sitting, Cuff Size: Adult Regular)   Pulse 78   Ht 1.73 m (5' 8.11\")   Wt 79.3 kg (174 lb 14.4 oz)   SpO2 96%   BMI " 26.51 kg/m     Constitutional:  Active.   EYES: anicteric, normal extra-ocular movements, no lid lag or retraction   HEENT: Mouth/Throat: Mucous membrane is moist. Oropharynx is clear. No adenopathy. Normal thyroid   Cardiovascular: RRR, S1, S2 normal. No m/g/r   Pulmonary/Chest: CTAB. No wheezing or rales   Abdominal: +BS. Nontender to palpation. No organomegaly present.  Neurological: Alert. Normal speech and gait.   Extremities: Normal vibration and monofilament [ due 5/2019]   Skin: normal texture, color  Lymphatic: no cervical lymphadenopathy.  Psychological: appropriate mood     In House Labs:   Lab Results   Component Value Date    A1C 9.1 03/10/2017    A1C 9.2 11/28/2016    A1C 8.8 07/25/2016    A1C 7.8 03/28/2016    A1C 7.6 01/14/2016       TSH   Date Value Ref Range Status   05/11/2018 3.50 0.40 - 4.00 mU/L Final   11/17/2017 6.16 (H) 0.40 - 4.00 mU/L Final   11/28/2016 2.89 0.40 - 4.00 mU/L Final   07/25/2016 7.92 (H) 0.40 - 4.00 mU/L Final   03/28/2016 2.70 0.40 - 4.00 mU/L Final     T4 Free   Date Value Ref Range Status   05/11/2018 1.14 0.76 - 1.46 ng/dL Final   11/17/2017 1.04 0.76 - 1.46 ng/dL Final   11/28/2016 1.20 0.76 - 1.46 ng/dL Final   07/25/2016 1.10 0.76 - 1.46 ng/dL Final   03/28/2016 1.13 0.76 - 1.46 ng/dL Final       Creatinine   Date Value Ref Range Status   05/11/2018 0.72 0.66 - 1.25 mg/dL Final   ]    Recent Labs   Lab Test  07/25/16   0714  07/22/15   0740 05/22/14   CHOL  253*  180  164   HDL  61  52  40   LDL  156*  113  107   TRIG  179*  76  83   CHOLHDLRATIO   --   3.5  4.10       Again, thank you for allowing me to participate in the care of your patient.        Sincerely,        Tramaine Hahn MD

## 2019-03-01 NOTE — TELEPHONE ENCOUNTER
Patient verified earlier appt via phone.    Kasey Carrero LPN  Diabetes Clinic Coordinator   Adult Endocrinology and Pediatric Specialty Clinics  [facility]

## 2019-05-26 ENCOUNTER — NURSE TRIAGE (OUTPATIENT)
Dept: NURSING | Facility: CLINIC | Age: 49
End: 2019-05-26

## 2019-05-26 NOTE — TELEPHONE ENCOUNTER
"Patient says he is currently in [place] and his Reina sensor was ripped out by accident.  FNA advised he has refills at [facility] but insurance may not cover it.  FNA advised to have Rx transferred to [facility] in [place] for him to pickup.  Caller verbalizes understanding.      Reason for Disposition    Caller has medication question only, adult not sick, and triager answers question    Additional Information    Negative: Drug overdose and nurse unable to answer question    Negative: Caller requesting information not related to medicine    Negative: Caller requesting a prescription for Strep throat and has a positive culture result    Negative: Rash while taking a medication or within 3 days of stopping it    Negative: Immunization reaction suspected    Negative: [1] Asthma and [2] having symptoms of asthma (cough, wheezing, etc)    Negative: MORE THAN A DOUBLE DOSE of a prescription or over-the-counter (OTC) drug    Negative: [1] DOUBLE DOSE (an extra dose or lesser amount) of over-the-counter (OTC) drug AND [2] any symptoms (e.g., dizziness, nausea, pain, sleepiness)    Negative: [1] DOUBLE DOSE (an extra dose or lesser amount) of prescription drug AND [2] any symptoms (e.g., dizziness, nausea, pain, sleepiness)    Negative: Took another person's prescription drug    Negative: [1] DOUBLE DOSE (an extra dose or lesser amount) of prescription drug AND [2] NO symptoms (Exception: a double dose of antibiotics)    Negative: Diabetes drug error or overdose (e.g., insulin or extra dose)    Negative: [1] Request for URGENT new prescription or refill of \"essential\" medication (i.e., likelihood of harm to patient if not taken) AND [2] triager unable to fill per unit policy    Negative: [1] Prescription not at pharmacy AND [2] was prescribed today by PCP    Negative: Pharmacy calling with prescription questions and triager unable to answer question    Negative: Caller has URGENT medication question about med that PCP " prescribed and triager unable to answer question    Negative: Caller has NON-URGENT medication question about med that PCP prescribed and triager unable to answer question    Negative: Caller requesting a NON-URGENT new prescription or refill and triager unable to refill per unit policy    Negative: Caller has medication question about med not prescribed by PCP and triager unable to answer question (e.g., compatibility with other med, storage)    Negative: [1] DOUBLE DOSE (an extra dose or lesser amount) of over-the-counter (OTC) drug AND [2] NO symptoms    Negative: [1] DOUBLE DOSE (an extra dose or lesser amount) of antibiotic drug AND [2] NO symptoms    Protocols used: MEDICATION QUESTION CALL-A-AH

## 2019-06-07 ENCOUNTER — MYC MEDICAL ADVICE (OUTPATIENT)
Dept: ENDOCRINOLOGY | Facility: CLINIC | Age: 49
End: 2019-06-07

## 2019-06-07 DIAGNOSIS — E10.65 TYPE 1 DIABETES MELLITUS WITH HYPERGLYCEMIA (H): Primary | ICD-10-CM

## 2019-07-16 ENCOUNTER — MYC MEDICAL ADVICE (OUTPATIENT)
Dept: ENDOCRINOLOGY | Facility: CLINIC | Age: 49
End: 2019-07-16

## 2019-07-16 DIAGNOSIS — E89.0 POSTABLATIVE HYPOTHYROIDISM: ICD-10-CM

## 2019-07-16 DIAGNOSIS — E10.65 TYPE 1 DIABETES MELLITUS WITH HYPERGLYCEMIA (H): ICD-10-CM

## 2019-07-16 RX ORDER — LEVOTHYROXINE SODIUM 100 UG/1
TABLET ORAL
Qty: 96 TABLET | Refills: 2 | Status: SHIPPED | OUTPATIENT
Start: 2019-07-16 | End: 2020-05-12

## 2019-07-17 ENCOUNTER — TELEPHONE (OUTPATIENT)
Dept: ENDOCRINOLOGY | Facility: CLINIC | Age: 49
End: 2019-07-17

## 2019-07-17 DIAGNOSIS — E10.65 TYPE 1 DIABETES MELLITUS WITH HYPERGLYCEMIA (H): Primary | ICD-10-CM

## 2019-07-17 RX ORDER — LANCETS
EACH MISCELLANEOUS
Qty: 102 EACH | Refills: 3 | Status: SHIPPED | OUTPATIENT
Start: 2019-07-17

## 2019-07-17 NOTE — TELEPHONE ENCOUNTER
Prescription for Accu Chek Guide test strips and lancets sent to pharmacy.    Kasey Carrero LPN  Diabetes Clinic Coordinator   Adult Endocrinology and Pediatric Specialty Clinics  [facility]

## 2019-07-17 NOTE — TELEPHONE ENCOUNTER
[facility] Call Center    Phone Message    May a detailed message be left on voicemail: yes    Reason for Call: Other: Patient would like a call back to discuss mychart message.     Action Taken: Message routed to:  Adult Clinics: Endocrinology p [phone]

## 2019-08-02 ENCOUNTER — OFFICE VISIT (OUTPATIENT)
Dept: ENDOCRINOLOGY | Facility: CLINIC | Age: 49
End: 2019-08-02
Payer: COMMERCIAL

## 2019-08-02 VITALS
OXYGEN SATURATION: 98 % | HEART RATE: 54 BPM | SYSTOLIC BLOOD PRESSURE: 150 MMHG | BODY MASS INDEX: 26.61 KG/M2 | DIASTOLIC BLOOD PRESSURE: 83 MMHG | WEIGHT: 175.6 LBS

## 2019-08-02 DIAGNOSIS — E10.65 TYPE 1 DIABETES MELLITUS WITH HYPERGLYCEMIA (H): Primary | ICD-10-CM

## 2019-08-02 LAB — HBA1C MFR BLD: 7.8 % (ref 0–5.6)

## 2019-08-02 PROCEDURE — 83036 HEMOGLOBIN GLYCOSYLATED A1C: CPT | Performed by: INTERNAL MEDICINE

## 2019-08-02 PROCEDURE — 99214 OFFICE O/P EST MOD 30 MIN: CPT | Performed by: INTERNAL MEDICINE

## 2019-08-02 PROCEDURE — 95251 CONT GLUC MNTR ANALYSIS I&R: CPT | Performed by: INTERNAL MEDICINE

## 2019-08-02 PROCEDURE — 36415 COLL VENOUS BLD VENIPUNCTURE: CPT | Performed by: INTERNAL MEDICINE

## 2019-08-02 NOTE — PROGRESS NOTES
Endocrinology Clinic Visit      08/02/2019    Chief Complaint: Diabetes     Interval history:   Reina working well. Had a few malfunctioning ones, is getting replacements for it.   The main issue is lows at night leading to overcorrection and lows in the afternoon leading to overcorrections.   This seems to be the most concerning issue,.   No severe hypoglycemia.     CGM physician interpretation:   Average   SD 92  28% in range, 68% above range and 4% below range 1% hypoglycemia  Coefficient of variation 41%    Major patterns    Hypoglycemia on certain days early in the morning around 2 AM leading to overcorrection.  Hypoglycemia around 2 PM leading to overcorrection    Recommend reducing basal insulin to avoid hypoglycemia as the initial step.          Assessment/Treatment Plan:      Carlos A Roberts is a 48 year old male:     1. Type 1 Diabetes with hyperglycemia:    A1c is 7.8.  There is no evidence of chronic diabetic complications.  On statin and ARB      Barriers to achieving glycemic goals:  1. High carb diet. Diet discussed.   2. Lack of testing  -  Using Reina   3. Erratic eating schedule due to work, does not check before meals.    4. Basal bolus Mismatch likely due to lack of bolusing.   Reduce Lantus to 35 units [ reduce 2 units if BG drops more than 50 overnight]  Continue Humalog 1 per 10 gm carb   Correction 1 per 40, goal is 140.     Nocturnal Hypoglycemia, and difficulty sensing it at night.   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   He takes it in AM with water, several hours before breakfast.   LT4 100 mcg daily for 6 days, 150 on Saturdays     I will contact the patient with the test results.    Lab today     Tramaine Hahn MD  [id number]  Endocrinology Service      =====================================================  HPI: Carlos A Roberts is 48 year old / Male who is here for a follow-up visit     He  has a past medical history of  Hyperlipidemia  Type 1 diabetes mellitus since childhood  Tobacco use disorder    History of Diabetes  Type 1. Diagnosed in about 12 years ago. At the time he had polyuria and polydipsia.     Complications    1. Retinopathy: No Last eye exam was nearly a year ago.   2. Nephropathy: ordered  3. Neuropathy None  4. Hypoglycemia once a month or less.   5. Macrovascular: No      Treatment  Diabetes Medication(s)     Insulin       insulin glargine (LANTUS SOLOSTAR PEN) 100 UNIT/ML pen    Inject 39 units daily plus 2 units for priming     insulin lispro (HUMALOG KWIKPEN) 100 UNIT/ML injection    Use as directed for carbohydrate and sliding scale coverage.  Max daily dose 50 units daily.        Lantus 39 units daily   Humalog 1 per 10 with   Not doing correction     Prevention  Lipid  LDL Cholesterol Calculated   Date Value Ref Range Status   11/17/2017 81 <100 mg/dL Final     Comment:     Desirable:       <100 mg/dl   07/25/2016 156 (H) <100 mg/dL Final     Comment:     Above desirable:  100-129 mg/dl   Borderline High:  130-159 mg/dL   High:             160-189 mg/dL   Very high:       >189 mg/dl       LDL Cholesterol Direct   Date Value Ref Range Status   03/01/2019 162 (H) <100 mg/dL Final     Comment:     Above desirable:  100-129 mg/dl  Borderline High:  130-159 mg/dL  High:             160-189 mg/dL  Very high:       >189 mg/dl     05/11/2018 107 (H) <100 mg/dL Final     Comment:     Above desirable:  100-129 mg/dl  Borderline High:  130-159 mg/dL  High:             160-189 mg/dL  Very high:       >189 mg/dl         Medications     HMG CoA Reductase Inhibitors     atorvastatin (LIPITOR) 80 MG tablet       Salicylates     ASPIRIN 81 MG OR TABS             Renal  Medication (Note: This includes the hypertensive combination subclass to make sure to show all ACEI/ARB's.)     ACE Inhibitors       LISINOPRIL 5 MG OR TABS    ONE DAILY            Weight  Wt Readings from Last 4 Encounters:    08/02/19 79.7 kg (175 lb 9.6 oz)   03/01/19 79.3 kg (174 lb 14.4 oz)   05/11/18 76.7 kg (169 lb)   11/17/17 81.1 kg (178 lb 12.7 oz)       Meter Download Summary:   2 hypoglycemia episode, he detected both.   Avg 303   28 test in 30 days.       Diet: Does not check before meals.   Meals are erratic because of work schedule.     Exercise None    Weight trend  Wt Readings from Last 4 Encounters:   08/02/19 79.7 kg (175 lb 9.6 oz)   03/01/19 79.3 kg (174 lb 14.4 oz)   05/11/18 76.7 kg (169 lb)   11/17/17 81.1 kg (178 lb 12.7 oz)       A1c today is   Lab Results   Component Value Date    A1C 9.1 03/10/2017    A1C 9.2 11/28/2016    A1C 8.8 07/25/2016    A1C 7.8 03/28/2016    A1C 7.6 01/14/2016       Barriers to achieve glycemic goals:   1. High carb diet.   2. Lack of testing  - he has started to test frequently.    3. Erratic eating schedule, does not check before meals.         Hypothyroidism  2015 : Graves disease - weight loss, sensation of warms.   WHITING ablation done in 2015  Started on LT4  Doing well  No symptoms of warmth or cold.   No change in weight or appetite. BM normal    No neck pain or problems swallowing.   No mood change -- recently  but feeling OK.          Allergies   Allergen Reactions     Penicillins        Current Outpatient Medications   Medication Sig Dispense Refill     atorvastatin (LIPITOR) 80 MG tablet Take 40 mg by mouth daily        Continuous Blood Gluc  (FREESTYLE KAR 14 DAY READER) ELIEL 1 Device continuous 1 Device 0     Continuous Blood Gluc Sensor (FREESTYLE KAR 14 DAY SENSOR) MISC 1 each continuous 6 each 3     insulin glargine (LANTUS SOLOSTAR PEN) 100 UNIT/ML pen Inject 39 units daily plus 2 units for priming 45 mL 3     insulin lispro (HUMALOG KWIKPEN) 100 UNIT/ML injection Use as directed for carbohydrate and sliding scale coverage.  Max daily dose 50 units daily. 15 mL 3     insulin pen needle (B-D U/F) 31G X 8 MM Use 4-5 daily or as directed. 100 each 3      levothyroxine (SYNTHROID/LEVOTHROID) 100 MCG tablet 1 tab Monday thru Saturday, 1.5 tabs on Sundays 96 tablet 2     LISINOPRIL 5 MG OR TABS ONE DAILY 1 month 0     Ostomy Supplies (SKIN TAC ADHESIVE BARRIER WIPE) MISC 1 each every 14 days 50 each 3     PARoxetine (PAXIL) 10 MG tablet Take 1 tablet (10 mg) by mouth every morning 30 tablet 0     ASPIRIN 81 MG OR TABS ONE DAILY       blood glucose (ACCU-CHEK JANNETH PLUS) test strip USE TO TEST BLOOD SUGARS FOUR TIMES DAILY OR AS DIRECTED (Patient not taking: Reported on 8/2/2019) 400 strip 3     blood glucose (ACCU-CHEK GUIDE) test strip Use to test blood sugar 4 times daily or as directed. (Patient not taking: Reported on 8/2/2019) 400 strip 3     blood glucose monitoring (ACCU-CHEK FASTCLIX) lancets Use to test blood sugar 4 times daily or as directed. (Patient not taking: Reported on 8/2/2019) 102 each 3       Review of Systems     Constitutional: Negative for fever and unexpected weight change. Appetite Normal   Head: no headache   Eye: no vision change/ loss of peripheral vision.   ENT: No throat congestion.   Respiratory: no cough   Cardiovascular: no chest pain or tachycardia  Gastrointestinal: Negative for vomiting, abdominal pain and diarrhea.  Genitourinary: No bladder problems.  Musculoskeletal: Negative for myalgias. No weakness.  Neurological: Negative for seizures and headaches.  Psychiatric/Behavioral: Negative for behavioral problem and dysphoric mood.    Past Medical History:   Diagnosis Date     Hyperlipidemia      Other and unspecified hyperlipidemia      Other psoriasis      Tobacco use disorder      Type I (juvenile type) diabetes mellitus without mention of complication, not stated as uncontrolled     Insulin dependent       Past Surgical History:   Procedure Laterality Date     APPENDECTOMY  Age 10       Family History   Problem Relation Age of Onset     Heart Disease Paternal Grandmother      Heart Disease Paternal Grandfather        Social  History     Social History     Marital status:      Spouse name: N/A     Number of children: N/A     Years of education: N/A     Social History Main Topics     Smoking status: Former Smoker     Quit date: 11/25/2007     Smokeless tobacco: Not on file     Alcohol use Yes     Drug use: No     Sexual activity: Not on file     Other Topics Concern     Not on file     Social History Narrative       Objective:   BP (!) 150/83 (BP Location: Left arm, Patient Position: Chair, Cuff Size: Adult Regular)   Pulse 54   Wt 79.7 kg (175 lb 9.6 oz)   SpO2 98%   BMI 26.61 kg/m    Constitutional:  Active.   EYES: anicteric, normal extra-ocular movements, no lid lag or retraction   HEENT: Mouth/Throat: Mucous membrane is moist. Oropharynx is clear. No adenopathy. Normal thyroid   Cardiovascular: RRR, S1, S2 normal. No m/g/r   Pulmonary/Chest: CTAB. No wheezing or rales   Abdominal: +BS. Nontender to palpation. No organomegaly present.  Neurological: Alert. Normal speech and gait.   Extremities: Normal vibration and monofilament [ due 7/2020]   Skin: normal texture, color  Lymphatic: no cervical lymphadenopathy.  Psychological: appropriate mood     In House Labs:   Lab Results   Component Value Date    A1C 9.1 03/10/2017    A1C 9.2 11/28/2016    A1C 8.8 07/25/2016    A1C 7.8 03/28/2016    A1C 7.6 01/14/2016       TSH   Date Value Ref Range Status   03/01/2019 3.05 0.40 - 4.00 mU/L Final   05/11/2018 3.50 0.40 - 4.00 mU/L Final   11/17/2017 6.16 (H) 0.40 - 4.00 mU/L Final   11/28/2016 2.89 0.40 - 4.00 mU/L Final   07/25/2016 7.92 (H) 0.40 - 4.00 mU/L Final     T4 Free   Date Value Ref Range Status   05/11/2018 1.14 0.76 - 1.46 ng/dL Final   11/17/2017 1.04 0.76 - 1.46 ng/dL Final   11/28/2016 1.20 0.76 - 1.46 ng/dL Final   07/25/2016 1.10 0.76 - 1.46 ng/dL Final   03/28/2016 1.13 0.76 - 1.46 ng/dL Final       Creatinine   Date Value Ref Range Status   03/01/2019 0.68 0.66 - 1.25 mg/dL Final   ]    Recent Labs   Lab Test   07/25/16   0714  07/22/15   0740 05/22/14   CHOL  253*  180  164   HDL  61  52  40   LDL  156*  113  107   TRIG  179*  76  83   CHOLHDLRATIO   --   3.5  4.10

## 2019-08-02 NOTE — NURSING NOTE
Carlos A Roberts's goals for this visit include:   Chief Complaint   Patient presents with     Diabetes       He requests these members of his care team be copied on today's visit information: Yes    PCP: Sean Barron    Referring Provider:  No referring provider defined for this encounter.    BP (!) 150/83 (BP Location: Left arm, Patient Position: Chair, Cuff Size: Adult Regular)   Pulse 54   Wt 79.7 kg (175 lb 9.6 oz)   SpO2 98%   BMI 26.61 kg/m      Do you need any medication refills at today's visit? No

## 2019-08-02 NOTE — LETTER
8/2/2019         RE: Carlos A Roberts  [address] [facility] [address]        Dear Colleague,    Thank you for referring your patient, Carlos A Roberts, to the [facility]. Please see a copy of my visit note below.    Endocrinology Clinic Visit      08/02/2019    Chief Complaint: Diabetes     Interval history:   Reina working well. Had a few malfunctioning ones, is getting replacements for it.   The main issue is lows at night leading to overcorrection and lows in the afternoon leading to overcorrections.   This seems to be the most concerning issue,.   No severe hypoglycemia.     CGM physician interpretation:   Average   SD 92  28% in range, 68% above range and 4% below range 1% hypoglycemia  Coefficient of variation 41%    Major patterns    Hypoglycemia on certain days early in the morning around 2 AM leading to overcorrection.  Hypoglycemia around 2 PM leading to overcorrection    Recommend reducing basal insulin to avoid hypoglycemia as the initial step.          Assessment/Treatment Plan:      Carlos A Roberts is a 48 year old male:     1. Type 1 Diabetes with hyperglycemia:    A1c is 7.8.  There is no evidence of chronic diabetic complications.  On statin and ARB      Barriers to achieving glycemic goals:  1. High carb diet. Diet discussed.   2. Lack of testing  -  Using Reina   3. Erratic eating schedule due to work, does not check before meals.    4. Basal bolus Mismatch likely due to lack of bolusing.   Reduce Lantus to 35 units [ reduce 2 units if BG drops more than 50 overnight]  Continue Humalog 1 per 10 gm carb   Correction 1 per 40, goal is 140.     Nocturnal Hypoglycemia, and difficulty sensing it at night.   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   He takes it in AM with water, several hours before breakfast.   LT4 100 mcg daily for 6 days, 150 on Saturdays     I will contact  the patient with the test results.    Lab today     Tramaine Hahn MD  [id number]  Endocrinology Service      =====================================================  HPI: Carlos A Roberts is 48 year old / Male who is here for a follow-up visit     He has a past medical history of  Hyperlipidemia  Type 1 diabetes mellitus since childhood  Tobacco use disorder    History of Diabetes  Type 1. Diagnosed in about 12 years ago. At the time he had polyuria and polydipsia.     Complications    1. Retinopathy: No Last eye exam was nearly a year ago.   2. Nephropathy: ordered  3. Neuropathy None  4. Hypoglycemia once a month or less.   5. Macrovascular: No      Treatment  Diabetes Medication(s)     Insulin       insulin glargine (LANTUS SOLOSTAR PEN) 100 UNIT/ML pen    Inject 39 units daily plus 2 units for priming     insulin lispro (HUMALOG KWIKPEN) 100 UNIT/ML injection    Use as directed for carbohydrate and sliding scale coverage.  Max daily dose 50 units daily.        Lantus 39 units daily   Humalog 1 per 10 with   Not doing correction     Prevention  Lipid  LDL Cholesterol Calculated   Date Value Ref Range Status   11/17/2017 81 <100 mg/dL Final     Comment:     Desirable:       <100 mg/dl   07/25/2016 156 (H) <100 mg/dL Final     Comment:     Above desirable:  100-129 mg/dl   Borderline High:  130-159 mg/dL   High:             160-189 mg/dL   Very high:       >189 mg/dl       LDL Cholesterol Direct   Date Value Ref Range Status   03/01/2019 162 (H) <100 mg/dL Final     Comment:     Above desirable:  100-129 mg/dl  Borderline High:  130-159 mg/dL  High:             160-189 mg/dL  Very high:       >189 mg/dl     05/11/2018 107 (H) <100 mg/dL Final     Comment:     Above desirable:  100-129 mg/dl  Borderline High:  130-159 mg/dL  High:             160-189 mg/dL  Very high:       >189 mg/dl         Medications     HMG CoA Reductase Inhibitors     atorvastatin (LIPITOR) 80 MG tablet       Salicylates     ASPIRIN 81 MG OR TABS              Renal  Medication (Note: This includes the hypertensive combination subclass to make sure to show all ACEI/ARB's.)     ACE Inhibitors       LISINOPRIL 5 MG OR TABS    ONE DAILY            Weight  Wt Readings from Last 4 Encounters:   08/02/19 79.7 kg (175 lb 9.6 oz)   03/01/19 79.3 kg (174 lb 14.4 oz)   05/11/18 76.7 kg (169 lb)   11/17/17 81.1 kg (178 lb 12.7 oz)       Meter Download Summary:   2 hypoglycemia episode, he detected both.   Avg 303   28 test in 30 days.       Diet: Does not check before meals.   Meals are erratic because of work schedule.     Exercise None    Weight trend  Wt Readings from Last 4 Encounters:   08/02/19 79.7 kg (175 lb 9.6 oz)   03/01/19 79.3 kg (174 lb 14.4 oz)   05/11/18 76.7 kg (169 lb)   11/17/17 81.1 kg (178 lb 12.7 oz)       A1c today is   Lab Results   Component Value Date    A1C 9.1 03/10/2017    A1C 9.2 11/28/2016    A1C 8.8 07/25/2016    A1C 7.8 03/28/2016    A1C 7.6 01/14/2016       Barriers to achieve glycemic goals:   1. High carb diet.   2. Lack of testing  - he has started to test frequently.    3. Erratic eating schedule, does not check before meals.         Hypothyroidism  2015 : Graves disease - weight loss, sensation of warms.   WHITING ablation done in 2015  Started on LT4  Doing well  No symptoms of warmth or cold.   No change in weight or appetite. BM normal    No neck pain or problems swallowing.   No mood change -- recently  but feeling OK.          Allergies   Allergen Reactions     Penicillins        Current Outpatient Medications   Medication Sig Dispense Refill     atorvastatin (LIPITOR) 80 MG tablet Take 40 mg by mouth daily        Continuous Blood Gluc  (FREESTYLE KAR 14 DAY READER) ELIEL 1 Device continuous 1 Device 0     Continuous Blood Gluc Sensor (FREESTYLE KAR 14 DAY SENSOR) MISC 1 each continuous 6 each 3     insulin glargine (LANTUS SOLOSTAR PEN) 100 UNIT/ML pen Inject 39 units daily plus 2 units for priming 45 mL 3      insulin lispro (HUMALOG KWIKPEN) 100 UNIT/ML injection Use as directed for carbohydrate and sliding scale coverage.  Max daily dose 50 units daily. 15 mL 3     insulin pen needle (B-D U/F) 31G X 8 MM Use 4-5 daily or as directed. 100 each 3     levothyroxine (SYNTHROID/LEVOTHROID) 100 MCG tablet 1 tab Monday thru Saturday, 1.5 tabs on Sundays 96 tablet 2     LISINOPRIL 5 MG OR TABS ONE DAILY 1 month 0     Ostomy Supplies (SKIN TAC ADHESIVE BARRIER WIPE) MISC 1 each every 14 days 50 each 3     PARoxetine (PAXIL) 10 MG tablet Take 1 tablet (10 mg) by mouth every morning 30 tablet 0     ASPIRIN 81 MG OR TABS ONE DAILY       blood glucose (ACCU-CHEK JANNETH PLUS) test strip USE TO TEST BLOOD SUGARS FOUR TIMES DAILY OR AS DIRECTED (Patient not taking: Reported on 8/2/2019) 400 strip 3     blood glucose (ACCU-CHEK GUIDE) test strip Use to test blood sugar 4 times daily or as directed. (Patient not taking: Reported on 8/2/2019) 400 strip 3     blood glucose monitoring (ACCU-CHEK FASTCLIX) lancets Use to test blood sugar 4 times daily or as directed. (Patient not taking: Reported on 8/2/2019) 102 each 3       Review of Systems     Constitutional: Negative for fever and unexpected weight change. Appetite Normal   Head: no headache   Eye: no vision change/ loss of peripheral vision.   ENT: No throat congestion.   Respiratory: no cough   Cardiovascular: no chest pain or tachycardia  Gastrointestinal: Negative for vomiting, abdominal pain and diarrhea.  Genitourinary: No bladder problems.  Musculoskeletal: Negative for myalgias. No weakness.  Neurological: Negative for seizures and headaches.  Psychiatric/Behavioral: Negative for behavioral problem and dysphoric mood.    Past Medical History:   Diagnosis Date     Hyperlipidemia      Other and unspecified hyperlipidemia      Other psoriasis      Tobacco use disorder      Type I (juvenile type) diabetes mellitus without mention of complication, not stated as uncontrolled      Insulin dependent       Past Surgical History:   Procedure Laterality Date     APPENDECTOMY  Age 10       Family History   Problem Relation Age of Onset     Heart Disease Paternal Grandmother      Heart Disease Paternal Grandfather        Social History     Social History     Marital status:      Spouse name: N/A     Number of children: N/A     Years of education: N/A     Social History Main Topics     Smoking status: Former Smoker     Quit date: 11/25/2007     Smokeless tobacco: Not on file     Alcohol use Yes     Drug use: No     Sexual activity: Not on file     Other Topics Concern     Not on file     Social History Narrative       Objective:   BP (!) 150/83 (BP Location: Left arm, Patient Position: Chair, Cuff Size: Adult Regular)   Pulse 54   Wt 79.7 kg (175 lb 9.6 oz)   SpO2 98%   BMI 26.61 kg/m     Constitutional:  Active.   EYES: anicteric, normal extra-ocular movements, no lid lag or retraction   HEENT: Mouth/Throat: Mucous membrane is moist. Oropharynx is clear. No adenopathy. Normal thyroid   Cardiovascular: RRR, S1, S2 normal. No m/g/r   Pulmonary/Chest: CTAB. No wheezing or rales   Abdominal: +BS. Nontender to palpation. No organomegaly present.  Neurological: Alert. Normal speech and gait.   Extremities: Normal vibration and monofilament [ due 7/2020]   Skin: normal texture, color  Lymphatic: no cervical lymphadenopathy.  Psychological: appropriate mood     In House Labs:   Lab Results   Component Value Date    A1C 9.1 03/10/2017    A1C 9.2 11/28/2016    A1C 8.8 07/25/2016    A1C 7.8 03/28/2016    A1C 7.6 01/14/2016       TSH   Date Value Ref Range Status   03/01/2019 3.05 0.40 - 4.00 mU/L Final   05/11/2018 3.50 0.40 - 4.00 mU/L Final   11/17/2017 6.16 (H) 0.40 - 4.00 mU/L Final   11/28/2016 2.89 0.40 - 4.00 mU/L Final   07/25/2016 7.92 (H) 0.40 - 4.00 mU/L Final     T4 Free   Date Value Ref Range Status   05/11/2018 1.14 0.76 - 1.46 ng/dL Final   11/17/2017 1.04 0.76 - 1.46 ng/dL Final    11/28/2016 1.20 0.76 - 1.46 ng/dL Final   07/25/2016 1.10 0.76 - 1.46 ng/dL Final   03/28/2016 1.13 0.76 - 1.46 ng/dL Final       Creatinine   Date Value Ref Range Status   03/01/2019 0.68 0.66 - 1.25 mg/dL Final   ]    Recent Labs   Lab Test  07/25/16   0714  07/22/15   0740 05/22/14   CHOL  253*  180  164   HDL  61  52  40   LDL  156*  113  107   TRIG  179*  76  83   CHOLHDLRATIO   --   3.5  4.10       Again, thank you for allowing me to participate in the care of your patient.        Sincerely,        Tramaine Hahn MD

## 2019-08-02 NOTE — PATIENT INSTRUCTIONS
Reduce Lantus to 35 units and monitor bedtime blood sugars and morning blood sugars. If the blood sugars drop more than 50 mg/dl reduce by 2 units every 2 days.     Use Humalog frequently with each meal plus correction (1 per 40 mg /dl starting at 140)

## 2019-10-21 DIAGNOSIS — E11.9 DIABETES MELLITUS, TYPE 2 (H): ICD-10-CM

## 2019-10-21 RX ORDER — INSULIN LISPRO 100 [IU]/ML
INJECTION, SOLUTION INTRAVENOUS; SUBCUTANEOUS
Qty: 15 ML | Refills: 3 | Status: SHIPPED | OUTPATIENT
Start: 2019-10-21 | End: 2020-10-19

## 2019-10-25 ENCOUNTER — TRANSFERRED RECORDS (OUTPATIENT)
Dept: HEALTH INFORMATION MANAGEMENT | Facility: CLINIC | Age: 49
End: 2019-10-25

## 2019-10-29 ENCOUNTER — MYC MEDICAL ADVICE (OUTPATIENT)
Dept: ENDOCRINOLOGY | Facility: CLINIC | Age: 49
End: 2019-10-29

## 2019-10-29 DIAGNOSIS — E10.65 TYPE 1 DIABETES MELLITUS WITH HYPERGLYCEMIA (H): Primary | ICD-10-CM

## 2019-10-30 RX ORDER — PROCHLORPERAZINE 25 MG/1
1 SUPPOSITORY RECTAL CONTINUOUS
Qty: 1 DEVICE | Refills: 0 | Status: SHIPPED | OUTPATIENT
Start: 2019-10-30 | End: 2020-08-18

## 2019-10-30 RX ORDER — PROCHLORPERAZINE 25 MG/1
1 SUPPOSITORY RECTAL CONTINUOUS
Qty: 1 EACH | Refills: 3 | Status: SHIPPED | OUTPATIENT
Start: 2019-10-30 | End: 2020-02-21

## 2019-10-30 RX ORDER — PROCHLORPERAZINE 25 MG/1
1 SUPPOSITORY RECTAL CONTINUOUS
Qty: 9 EACH | Refills: 3 | Status: SHIPPED | OUTPATIENT
Start: 2019-10-30 | End: 2020-08-11

## 2019-10-30 NOTE — TELEPHONE ENCOUNTER
Dexcom G6 CGM system ordered.    Patient updated via My Chart.    Kasey Carrero LPN  Diabetes Clinic Coordinator   [facility]  [facility]

## 2019-11-04 ENCOUNTER — HEALTH MAINTENANCE LETTER (OUTPATIENT)
Age: 49
End: 2019-11-04

## 2019-11-29 NOTE — TELEPHONE ENCOUNTER
Faxed refill request received from [facility].   Medication Requested: Levothyroxine 100mcg  Directions: Take one tablet Monday thru Saturday and take 1.5 tablets on Sundays  Quantity: 96  Last Office Visit: 3/1/19  Next Appointment Scheduled for: 8/2/19       abdominal pain , unable to eat

## 2019-12-05 NOTE — PROGRESS NOTES
Endocrinology Clinic Visit      12/06/2019    Chief Complaint: Diabetes     Interval history:   Reina working well.   Recent high after 49th b'd[age], travel and thanksgiving,     Lows are by and large eleiminated.   This seems to be the most concerning issue,.   No severe hypoglycemia.     CGM physician interpretation:   Average   SD 92  28% in range, 68% above range and 4% below range 1% hypoglycemia  Coefficient of variation 41%    Major patterns    Hypoglycemia on certain days early in the morning around 2 AM leading to overcorrection.  Hypoglycemia around 2 PM leading to overcorrection    Recommend reducing basal insulin to avoid hypoglycemia as the initial step.          Assessment/Treatment Plan:      Carlos A Roberts is a 49 year old male:     1. Type 1 Diabetes with hyperglycemia:    A1c is 7.8.  There is no evidence of chronic diabetic complications.  On statin and ARB      Barriers to achieving glycemic goals:  1. High carb diet. Will work on it after holiday season.    2. Lack of testing  -  Using Reina   3. Erratic eating schedule due to work, does not check before meals.    4. Basal bolus Mismatch  Lantus to 35 units has essentially eliminated lows.   Continue Humalog 1 per 15 gm before a meal. If hyperglycemic, then reduce ratio to 10. Discussed in detail.   Correction 1 per 40, goal is 140.     Nocturnal Hypoglycemia, and difficulty sensing it at night.   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   He takes it in AM with water, several hours before breakfast.   LT4 100 mcg daily for 6 days, 150 on Saturdays     Tramaine Hahn MD  [id number]  Endocrinology Service      =====================================================  HPI: Carlos A Roberts is 49 year old / Male who is here for a follow-up visit     He has a past medical history of  Hyperlipidemia  Type 1 diabetes mellitus since childhood  Tobacco use  disorder    History of Diabetes  Type 1. Diagnosed in about 12 years ago. At the time he had polyuria and polydipsia.     Complications    1. Retinopathy: No Last eye exam was nearly a year ago.   2. Nephropathy: ordered  3. Neuropathy None  4. Hypoglycemia once a month or less.   5. Macrovascular: No      Treatment  Diabetes Medication(s)     Insulin       insulin glargine (LANTUS SOLOSTAR PEN) 100 UNIT/ML pen    Inject 39 units daily plus 2 units for priming     insulin lispro (HUMALOG KWIKPEN) 100 UNIT/ML (1 unit dial) pen    USE AS DIRECTED FOR CARBOHYDRATE AND SLIDING SCALE COVERAGE. MAX DAILY DOSE 50 UNITS        Lantus 35 units daily   Humalog 1 per 15 with   Not doing correction     Prevention  Lipid  LDL Cholesterol Calculated   Date Value Ref Range Status   11/17/2017 81 <100 mg/dL Final     Comment:     Desirable:       <100 mg/dl   07/25/2016 156 (H) <100 mg/dL Final     Comment:     Above desirable:  100-129 mg/dl   Borderline High:  130-159 mg/dL   High:             160-189 mg/dL   Very high:       >189 mg/dl       LDL Cholesterol Direct   Date Value Ref Range Status   03/01/2019 162 (H) <100 mg/dL Final     Comment:     Above desirable:  100-129 mg/dl  Borderline High:  130-159 mg/dL  High:             160-189 mg/dL  Very high:       >189 mg/dl     05/11/2018 107 (H) <100 mg/dL Final     Comment:     Above desirable:  100-129 mg/dl  Borderline High:  130-159 mg/dL  High:             160-189 mg/dL  Very high:       >189 mg/dl         Medications     HMG CoA Reductase Inhibitors     atorvastatin (LIPITOR) 80 MG tablet       Salicylates     ASPIRIN 81 MG OR TABS             Renal  Medication (Note: This includes the hypertensive combination subclass to make sure to show all ACEI/ARB's.)     ACE Inhibitors       LISINOPRIL 5 MG OR TABS    ONE DAILY            Weight  Wt Readings from Last 4 Encounters:   12/06/19 80.4 kg (177 lb 4.8 oz)   08/02/19 79.7 kg (175 lb 9.6 oz)   03/01/19 79.3 kg (174 lb 14.4  oz)   05/11/18 76.7 kg (169 lb)       Meter Download Summary:   2 hypoglycemia episode, he detected both.   Avg 303   28 test in 30 days.       Diet: Does not check before meals.   Meals are erratic because of work schedule.     Exercise None    Weight trend  Wt Readings from Last 4 Encounters:   12/06/19 80.4 kg (177 lb 4.8 oz)   08/02/19 79.7 kg (175 lb 9.6 oz)   03/01/19 79.3 kg (174 lb 14.4 oz)   05/11/18 76.7 kg (169 lb)       A1c today is   Lab Results   Component Value Date    A1C 9.1 03/10/2017    A1C 9.2 11/28/2016    A1C 8.8 07/25/2016    A1C 7.8 03/28/2016    A1C 7.6 01/14/2016       Barriers to achieve glycemic goals:   1. High carb diet.   2. Lack of testing  - he has started to test frequently.    3. Erratic eating schedule, does not check before meals.         Hypothyroidism  2015 : Graves disease - weight loss, sensation of warms.   WHITING ablation done in 2015  Started on LT4  Doing well  No symptoms of warmth or cold.   No change in weight or appetite. BM normal    No neck pain or problems swallowing.   No mood change -- recently  but feeling OK.          Allergies   Allergen Reactions     Penicillins        Current Outpatient Medications   Medication Sig Dispense Refill     atorvastatin (LIPITOR) 80 MG tablet Take 40 mg by mouth daily        Continuous Blood Gluc  (FREESTYLE KAR 14 DAY READER) ELIEL 1 Device continuous 1 Device 0     Continuous Blood Gluc Sensor (FREESTYLE KAR 14 DAY SENSOR) MISC 1 each continuous 6 each 3     insulin glargine (LANTUS SOLOSTAR PEN) 100 UNIT/ML pen Inject 39 units daily plus 2 units for priming 45 mL 3     insulin lispro (HUMALOG KWIKPEN) 100 UNIT/ML (1 unit dial) pen USE AS DIRECTED FOR CARBOHYDRATE AND SLIDING SCALE COVERAGE. MAX DAILY DOSE 50 UNITS 15 mL 3     insulin pen needle (B-D U/F) 31G X 8 MM Use 4-5 daily or as directed. 100 each 3     levothyroxine (SYNTHROID/LEVOTHROID) 100 MCG tablet 1 tab Monday thru Saturday, 1.5 tabs on Sundays 96  tablet 2     LISINOPRIL 5 MG OR TABS ONE DAILY 1 month 0     Ostomy Supplies (SKIN TAC ADHESIVE BARRIER WIPE) MISC 1 each every 14 days 50 each 3     PARoxetine (PAXIL) 10 MG tablet Take 1 tablet (10 mg) by mouth every morning 30 tablet 0     ASPIRIN 81 MG OR TABS ONE DAILY       blood glucose (ACCU-CHEK JANNETH PLUS) test strip USE TO TEST BLOOD SUGARS FOUR TIMES DAILY OR AS DIRECTED (Patient not taking: Reported on 8/2/2019) 400 strip 3     blood glucose (ACCU-CHEK GUIDE) test strip Use to test blood sugar 4 times daily or as directed. (Patient not taking: Reported on 8/2/2019) 400 strip 3     blood glucose monitoring (ACCU-CHEK FASTCLIX) lancets Use to test blood sugar 4 times daily or as directed. (Patient not taking: Reported on 8/2/2019) 102 each 3     Continuous Blood Gluc  (DEXCOM G6 ) ELIEL 1 Device continuous (Patient not taking: Reported on 12/6/2019) 1 Device 0     Continuous Blood Gluc Sensor (DEXCOM G6 SENSOR) MISC 1 each continuous Change every 10 days (Patient not taking: Reported on 12/6/2019) 9 each 3     Continuous Blood Gluc Transmit (DEXCOM G6 TRANSMITTER) MISC 1 each continuous Change every 3 months (Patient not taking: Reported on 12/6/2019) 1 each 3       Review of Systems     Constitutional: Negative for fever and unexpected weight change. Appetite Normal   Head: no headache   Eye: no vision change/ loss of peripheral vision.   ENT: No throat congestion.   Respiratory: no cough   Cardiovascular: no chest pain or tachycardia  Gastrointestinal: Negative for vomiting, abdominal pain and diarrhea.  Genitourinary: No bladder problems.  Musculoskeletal: Negative for myalgias. No weakness.  Neurological: Negative for seizures and headaches.  Psychiatric/Behavioral: Negative for behavioral problem and dysphoric mood.    Past Medical History:   Diagnosis Date     Hyperlipidemia      Other and unspecified hyperlipidemia      Other psoriasis      Tobacco use disorder      Type I (juvenile  type) diabetes mellitus without mention of complication, not stated as uncontrolled     Insulin dependent       Past Surgical History:   Procedure Laterality Date     APPENDECTOMY  Age 10       Family History   Problem Relation Age of Onset     Heart Disease Paternal Grandmother      Heart Disease Paternal Grandfather        Social History     Social History     Marital status:      Spouse name: N/A     Number of children: N/A     Years of education: N/A     Social History Main Topics     Smoking status: Former Smoker     Quit date: 11/25/2007     Smokeless tobacco: Not on file     Alcohol use Yes     Drug use: No     Sexual activity: Not on file     Other Topics Concern     Not on file     Social History Narrative       Objective:   /81 (BP Location: Left arm, Patient Position: Sitting, Cuff Size: Adult Regular)   Pulse 91   Wt 80.4 kg (177 lb 4.8 oz)   SpO2 97%   BMI 26.87 kg/m    Constitutional:  Active.   EYES: anicteric, normal extra-ocular movements, no lid lag or retraction   HEENT: Mouth/Throat: Mucous membrane is moist. Oropharynx is clear. No adenopathy. Normal thyroid   Cardiovascular: RRR, S1, S2 normal. No m/g/r   Pulmonary/Chest: CTAB. No wheezing or rales   Abdominal: +BS. Nontender to palpation. No organomegaly present.  Neurological: Alert. Normal speech and gait.   Extremities: Normal vibration and monofilament [ due 7/2020]   Skin: normal texture, color  Lymphatic: no cervical lymphadenopathy.  Psychological: appropriate mood     In House Labs:   Lab Results   Component Value Date    A1C 9.1 03/10/2017    A1C 9.2 11/28/2016    A1C 8.8 07/25/2016    A1C 7.8 03/28/2016    A1C 7.6 01/14/2016       TSH   Date Value Ref Range Status   03/01/2019 3.05 0.40 - 4.00 mU/L Final   05/11/2018 3.50 0.40 - 4.00 mU/L Final   11/17/2017 6.16 (H) 0.40 - 4.00 mU/L Final   11/28/2016 2.89 0.40 - 4.00 mU/L Final   07/25/2016 7.92 (H) 0.40 - 4.00 mU/L Final     T4 Free   Date Value Ref Range Status    05/11/2018 1.14 0.76 - 1.46 ng/dL Final   11/17/2017 1.04 0.76 - 1.46 ng/dL Final   11/28/2016 1.20 0.76 - 1.46 ng/dL Final   07/25/2016 1.10 0.76 - 1.46 ng/dL Final   03/28/2016 1.13 0.76 - 1.46 ng/dL Final       Creatinine   Date Value Ref Range Status   03/01/2019 0.68 0.66 - 1.25 mg/dL Final   ]    Recent Labs   Lab Test  07/25/16   0714  07/22/15   0740 05/22/14   CHOL  253*  180  164   HDL  61  52  40   LDL  156*  113  107   TRIG  179*  76  83   CHOLHDLRATIO   --   3.5  4.10

## 2019-12-06 ENCOUNTER — OFFICE VISIT (OUTPATIENT)
Dept: ENDOCRINOLOGY | Facility: CLINIC | Age: 49
End: 2019-12-06
Payer: COMMERCIAL

## 2019-12-06 VITALS
SYSTOLIC BLOOD PRESSURE: 114 MMHG | BODY MASS INDEX: 26.87 KG/M2 | DIASTOLIC BLOOD PRESSURE: 81 MMHG | OXYGEN SATURATION: 97 % | WEIGHT: 177.3 LBS | HEART RATE: 91 BPM

## 2019-12-06 DIAGNOSIS — E10.65 TYPE 1 DIABETES MELLITUS WITH HYPERGLYCEMIA (H): ICD-10-CM

## 2019-12-06 LAB — HBA1C MFR BLD: 7.8 % (ref 0–5.6)

## 2019-12-06 PROCEDURE — 99214 OFFICE O/P EST MOD 30 MIN: CPT | Performed by: INTERNAL MEDICINE

## 2019-12-06 PROCEDURE — 36415 COLL VENOUS BLD VENIPUNCTURE: CPT | Performed by: INTERNAL MEDICINE

## 2019-12-06 PROCEDURE — 83036 HEMOGLOBIN GLYCOSYLATED A1C: CPT | Performed by: INTERNAL MEDICINE

## 2019-12-06 NOTE — LETTER
12/6/2019         RE: Carlos A Roberts  [address] [facility] [id number]        Dear Colleague,    Thank you for referring your patient, Carlos A Roberts, to the [facility]. Please see a copy of my visit note below.    Endocrinology Clinic Visit      12/06/2019    Chief Complaint: Diabetes     Interval history:   Reina working well.   Recent high after 49th b'day, travel and thanksgiving,     Lows are by and large eleiminated.   This seems to be the most concerning issue,.   No severe hypoglycemia.     CGM physician interpretation:   Average   SD 92  28% in range, 68% above range and 4% below range 1% hypoglycemia  Coefficient of variation 41%    Major patterns    Hypoglycemia on certain days early in the morning around 2 AM leading to overcorrection.  Hypoglycemia around 2 PM leading to overcorrection    Recommend reducing basal insulin to avoid hypoglycemia as the initial step.          Assessment/Treatment Plan:      Carlos A Roberts is a 49 year old male:     1. Type 1 Diabetes with hyperglycemia:    A1c is 7.8.  There is no evidence of chronic diabetic complications.  On statin and ARB      Barriers to achieving glycemic goals:  1. High carb diet. Will work on it after holiday season.    2. Lack of testing  -  Using Reina   3. Erratic eating schedule due to work, does not check before meals.    4. Basal bolus Mismatch  Lantus to 35 units has essentially eliminated lows.   Continue Humalog 1 per 15 gm before a meal. If hyperglycemic, then reduce ratio to 10. Discussed in detail.   Correction 1 per 40, goal is 140.     Nocturnal Hypoglycemia, and difficulty sensing it at night.   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   He takes it in AM with water, several hours before breakfast.   LT4 100 mcg daily for 6 days, 150 on Saturdays     Tramaine Hahn MD  [id number]  Endocrinology  [facility]      =====================================================  HPI: Carlos A Roberts is 49 year old / Male who is here for a follow-up visit     He has a past medical history of  Hyperlipidemia  Type 1 diabetes mellitus since childhood  Tobacco use disorder    History of Diabetes  Type 1. Diagnosed in about 12 years ago. At the time he had polyuria and polydipsia.     Complications    1. Retinopathy: No Last eye exam was nearly a year ago.   2. Nephropathy: ordered  3. Neuropathy None  4. Hypoglycemia once a month or less.   5. Macrovascular: No      Treatment  Diabetes Medication(s)     Insulin       insulin glargine (LANTUS SOLOSTAR PEN) 100 UNIT/ML pen    Inject 39 units daily plus 2 units for priming     insulin lispro (HUMALOG KWIKPEN) 100 UNIT/ML (1 unit dial) pen    USE AS DIRECTED FOR CARBOHYDRATE AND SLIDING SCALE COVERAGE. MAX DAILY DOSE 50 UNITS        Lantus 35 units daily   Humalog 1 per 15 with   Not doing correction     Prevention  Lipid  LDL Cholesterol Calculated   Date Value Ref Range Status   11/17/2017 81 <100 mg/dL Final     Comment:     Desirable:       <100 mg/dl   07/25/2016 156 (H) <100 mg/dL Final     Comment:     Above desirable:  100-129 mg/dl   Borderline High:  130-159 mg/dL   High:             160-189 mg/dL   Very high:       >189 mg/dl       LDL Cholesterol Direct   Date Value Ref Range Status   03/01/2019 162 (H) <100 mg/dL Final     Comment:     Above desirable:  100-129 mg/dl  Borderline High:  130-159 mg/dL  High:             160-189 mg/dL  Very high:       >189 mg/dl     05/11/2018 107 (H) <100 mg/dL Final     Comment:     Above desirable:  100-129 mg/dl  Borderline High:  130-159 mg/dL  High:             160-189 mg/dL  Very high:       >189 mg/dl         Medications     HMG CoA Reductase Inhibitors     atorvastatin (LIPITOR) 80 MG tablet       Salicylates     ASPIRIN 81 MG OR TABS             Renal  Medication (Note: This includes the hypertensive combination subclass to  make sure to show all ACEI/ARB's.)     ACE Inhibitors       LISINOPRIL 5 MG OR TABS    ONE DAILY            Weight  Wt Readings from Last 4 Encounters:   12/06/19 80.4 kg (177 lb 4.8 oz)   08/02/19 79.7 kg (175 lb 9.6 oz)   03/01/19 79.3 kg (174 lb 14.4 oz)   05/11/18 76.7 kg (169 lb)       Meter Download Summary:   2 hypoglycemia episode, he detected both.   Avg 303   28 test in 30 days.       Diet: Does not check before meals.   Meals are erratic because of work schedule.     Exercise None    Weight trend  Wt Readings from Last 4 Encounters:   12/06/19 80.4 kg (177 lb 4.8 oz)   08/02/19 79.7 kg (175 lb 9.6 oz)   03/01/19 79.3 kg (174 lb 14.4 oz)   05/11/18 76.7 kg (169 lb)       A1c today is   Lab Results   Component Value Date    A1C 9.1 03/10/2017    A1C 9.2 11/28/2016    A1C 8.8 07/25/2016    A1C 7.8 03/28/2016    A1C 7.6 01/14/2016       Barriers to achieve glycemic goals:   1. High carb diet.   2. Lack of testing  - he has started to test frequently.    3. Erratic eating schedule, does not check before meals.         Hypothyroidism  2015 : Graves disease - weight loss, sensation of warms.   WHITING ablation done in 2015  Started on LT4  Doing well  No symptoms of warmth or cold.   No change in weight or appetite. BM normal    No neck pain or problems swallowing.   No mood change -- recently  but feeling OK.          Allergies   Allergen Reactions     Penicillins        Current Outpatient Medications   Medication Sig Dispense Refill     atorvastatin (LIPITOR) 80 MG tablet Take 40 mg by mouth daily        Continuous Blood Gluc  (FREESTYLE KAR 14 DAY READER) ELIEL 1 Device continuous 1 Device 0     Continuous Blood Gluc Sensor (FREESTYLE KAR 14 DAY SENSOR) MISC 1 each continuous 6 each 3     insulin glargine (LANTUS SOLOSTAR PEN) 100 UNIT/ML pen Inject 39 units daily plus 2 units for priming 45 mL 3     insulin lispro (HUMALOG KWIKPEN) 100 UNIT/ML (1 unit dial) pen USE AS DIRECTED FOR CARBOHYDRATE  AND SLIDING SCALE COVERAGE. MAX DAILY DOSE 50 UNITS 15 mL 3     insulin pen needle (B-D U/F) 31G X 8 MM Use 4-5 daily or as directed. 100 each 3     levothyroxine (SYNTHROID/LEVOTHROID) 100 MCG tablet 1 tab Monday thru Saturday, 1.5 tabs on Sundays 96 tablet 2     LISINOPRIL 5 MG OR TABS ONE DAILY 1 month 0     Ostomy Supplies (SKIN TAC ADHESIVE BARRIER WIPE) MISC 1 each every 14 days 50 each 3     PARoxetine (PAXIL) 10 MG tablet Take 1 tablet (10 mg) by mouth every morning 30 tablet 0     ASPIRIN 81 MG OR TABS ONE DAILY       blood glucose (ACCU-CHEK JANNETH PLUS) test strip USE TO TEST BLOOD SUGARS FOUR TIMES DAILY OR AS DIRECTED (Patient not taking: Reported on 8/2/2019) 400 strip 3     blood glucose (ACCU-CHEK GUIDE) test strip Use to test blood sugar 4 times daily or as directed. (Patient not taking: Reported on 8/2/2019) 400 strip 3     blood glucose monitoring (ACCU-CHEK FASTCLIX) lancets Use to test blood sugar 4 times daily or as directed. (Patient not taking: Reported on 8/2/2019) 102 each 3     Continuous Blood Gluc  (DEXCOM G6 ) ELIEL 1 Device continuous (Patient not taking: Reported on 12/6/2019) 1 Device 0     Continuous Blood Gluc Sensor (DEXCOM G6 SENSOR) MISC 1 each continuous Change every 10 days (Patient not taking: Reported on 12/6/2019) 9 each 3     Continuous Blood Gluc Transmit (DEXCOM G6 TRANSMITTER) MISC 1 each continuous Change every 3 months (Patient not taking: Reported on 12/6/2019) 1 each 3       Review of Systems     Constitutional: Negative for fever and unexpected weight change. Appetite Normal   Head: no headache   Eye: no vision change/ loss of peripheral vision.   ENT: No throat congestion.   Respiratory: no cough   Cardiovascular: no chest pain or tachycardia  Gastrointestinal: Negative for vomiting, abdominal pain and diarrhea.  Genitourinary: No bladder problems.  Musculoskeletal: Negative for myalgias. No weakness.  Neurological: Negative for seizures and  headaches.  Psychiatric/Behavioral: Negative for behavioral problem and dysphoric mood.    Past Medical History:   Diagnosis Date     Hyperlipidemia      Other and unspecified hyperlipidemia      Other psoriasis      Tobacco use disorder      Type I (juvenile type) diabetes mellitus without mention of complication, not stated as uncontrolled     Insulin dependent       Past Surgical History:   Procedure Laterality Date     APPENDECTOMY  Age 10       Family History   Problem Relation Age of Onset     Heart Disease Paternal Grandmother      Heart Disease Paternal Grandfather        Social History     Social History     Marital status:      Spouse name: N/A     Number of children: N/A     Years of education: N/A     Social History Main Topics     Smoking status: Former Smoker     Quit date: 11/25/2007     Smokeless tobacco: Not on file     Alcohol use Yes     Drug use: No     Sexual activity: Not on file     Other Topics Concern     Not on file     Social History Narrative       Objective:   /81 (BP Location: Left arm, Patient Position: Sitting, Cuff Size: Adult Regular)   Pulse 91   Wt 80.4 kg (177 lb 4.8 oz)   SpO2 97%   BMI 26.87 kg/m     Constitutional:  Active.   EYES: anicteric, normal extra-ocular movements, no lid lag or retraction   HEENT: Mouth/Throat: Mucous membrane is moist. Oropharynx is clear. No adenopathy. Normal thyroid   Cardiovascular: RRR, S1, S2 normal. No m/g/r   Pulmonary/Chest: CTAB. No wheezing or rales   Abdominal: +BS. Nontender to palpation. No organomegaly present.  Neurological: Alert. Normal speech and gait.   Extremities: Normal vibration and monofilament [ due 7/2020]   Skin: normal texture, color  Lymphatic: no cervical lymphadenopathy.  Psychological: appropriate mood     In House Labs:   Lab Results   Component Value Date    A1C 9.1 03/10/2017    A1C 9.2 11/28/2016    A1C 8.8 07/25/2016    A1C 7.8 03/28/2016    A1C 7.6 01/14/2016       TSH   Date Value Ref Range  Status   03/01/2019 3.05 0.40 - 4.00 mU/L Final   05/11/2018 3.50 0.40 - 4.00 mU/L Final   11/17/2017 6.16 (H) 0.40 - 4.00 mU/L Final   11/28/2016 2.89 0.40 - 4.00 mU/L Final   07/25/2016 7.92 (H) 0.40 - 4.00 mU/L Final     T4 Free   Date Value Ref Range Status   05/11/2018 1.14 0.76 - 1.46 ng/dL Final   11/17/2017 1.04 0.76 - 1.46 ng/dL Final   11/28/2016 1.20 0.76 - 1.46 ng/dL Final   07/25/2016 1.10 0.76 - 1.46 ng/dL Final   03/28/2016 1.13 0.76 - 1.46 ng/dL Final       Creatinine   Date Value Ref Range Status   03/01/2019 0.68 0.66 - 1.25 mg/dL Final   ]    Recent Labs   Lab Test  07/25/16   0714  07/22/15   0740 05/22/14   CHOL  253*  180  164   HDL  61  52  40   LDL  156*  113  107   TRIG  179*  76  83   CHOLHDLRATIO   --   3.5  4.10       Again, thank you for allowing me to participate in the care of your patient.        Sincerely,        Tramaine Hahn MD

## 2019-12-06 NOTE — NURSING NOTE
Carlos A Roberts's goals for this visit include:   Chief Complaint   Patient presents with     Diabetes     He requests these members of his care team be copied on today's visit information: Yes     PCP: Sean Barron    Referring Provider:  Sean Barron MD  [address] [place] [address]    /81 (BP Location: Left arm, Patient Position: Sitting, Cuff Size: Adult Regular)   Pulse 91   Wt 80.4 kg (177 lb 4.8 oz)   SpO2 97%   BMI 26.87 kg/m      Do you need any medication refills at today's visit? No

## 2019-12-06 NOTE — PATIENT INSTRUCTIONS
Lantus 35 units daily  Humalog 1 per 15 g carb, before you start meals.   Correction factor of 40.   Review CGM data every night.     [facility]  Blanca Beth RN, Diabetes Educator: [phone]  Kasey Carrero LPN Diabetes Clinic Coordinator: [phone]  Clinic Line:[phone]  Clinic Fax: [phone]  On-Call Endocrine Provider at [facility] (after hours/weekends): [phone] option 4  Scheduling Line: [phone]    Appointment Reminders:  * Please bring meter and/or CGM device with for staff to download  * If you are due ONLY for an A1C, it is scheduled with the nurse and will be done in clinic. You do not need to schedule a lab appointment. Fasting is not required for an A1C.  * Refill request should be submitted to your pharmacy. They will contact clinic for approval.

## 2020-02-20 DIAGNOSIS — E10.65 TYPE 1 DIABETES MELLITUS WITH HYPERGLYCEMIA (H): ICD-10-CM

## 2020-02-20 NOTE — TELEPHONE ENCOUNTER
Continuous Blood Gluc Sensor (FREESTYLE KAR 14 DAY SENSOR) XX MISC      Last Written Prescription Date:  3-1-19  Last Fill Quantity: 6 each,   # refills: 3  Last Office Visit : 12-6-19  Future Office visit:  4-14-20      10-29-19 my chart message                      new rx for Dexacom G6    Last clinic note: 12-6-19  2. Lack of testing  -  Using Kar   Using kar and testing at night      Also on  Med list:Continuous Blood Gluc Sensor (DEXCOM G6 SENSOR) MISC      Routing refill request to provider for review/approval because:  2 rx for sensors on med list   remove any meds not current

## 2020-02-20 NOTE — TELEPHONE ENCOUNTER
Will forward to EVANGELINA Gee for signing.    Kasey Carrero LPN  Diabetes Clinic Coordinator   [facility]ology [facility] Clin[facility]  [facility]

## 2020-02-21 RX ORDER — FLASH GLUCOSE SENSOR
1 KIT MISCELLANEOUS CONTINUOUS
Qty: 6 EACH | Refills: 3 | OUTPATIENT
Start: 2020-02-21

## 2020-04-08 ENCOUNTER — MYC MEDICAL ADVICE (OUTPATIENT)
Dept: ENDOCRINOLOGY | Facility: CLINIC | Age: 50
End: 2020-04-08

## 2020-04-14 ENCOUNTER — VIRTUAL VISIT (OUTPATIENT)
Dept: ENDOCRINOLOGY | Facility: CLINIC | Age: 50
End: 2020-04-14
Payer: COMMERCIAL

## 2020-04-14 DIAGNOSIS — E78.00 ELEVATED CHOLESTEROL: ICD-10-CM

## 2020-04-14 DIAGNOSIS — E10.65 TYPE 1 DIABETES MELLITUS WITH HYPERGLYCEMIA (H): Primary | ICD-10-CM

## 2020-04-14 DIAGNOSIS — E89.0 POSTABLATIVE HYPOTHYROIDISM: ICD-10-CM

## 2020-04-14 PROCEDURE — 99215 OFFICE O/P EST HI 40 MIN: CPT | Mod: GT | Performed by: INTERNAL MEDICINE

## 2020-04-14 PROCEDURE — 95251 CONT GLUC MNTR ANALYSIS I&R: CPT | Mod: GT | Performed by: INTERNAL MEDICINE

## 2020-04-14 RX ORDER — ATORVASTATIN CALCIUM 40 MG/1
40 TABLET, FILM COATED ORAL DAILY
Qty: 90 TABLET | Refills: 3 | COMMUNITY
Start: 2020-04-14

## 2020-04-14 RX ORDER — PEN NEEDLE, DIABETIC 32GX 5/32"
NEEDLE, DISPOSABLE MISCELLANEOUS
Qty: 360 EACH | Refills: 3 | Status: SHIPPED | OUTPATIENT
Start: 2020-04-14

## 2020-04-14 RX ORDER — INSULIN GLARGINE 300 U/ML
35 INJECTION, SOLUTION SUBCUTANEOUS AT BEDTIME
Qty: 42 ML | Refills: 3 | Status: SHIPPED | OUTPATIENT
Start: 2020-04-14

## 2020-04-14 RX ORDER — IBUPROFEN 600 MG/1
TABLET ORAL
Qty: 1 EACH | Refills: 3 | Status: SHIPPED | OUTPATIENT
Start: 2020-04-14

## 2020-04-14 NOTE — PROGRESS NOTES
"Carlos A Roberts is a 49 year old male who is being evaluated via a billable video visit.      The patient has been notified of following:     \"This video visit will be conducted via a call between you and your physician/provider. We have found that certain health care needs can be provided without the need for an in-person physical exam.  This service lets us provide the care you need with a video conversation.  If a prescription is necessary we can send it directly to your pharmacy.  If lab work is needed we can place an order for that and you can then stop by our lab to have the test done at a later time.    Video visits are billed at different rates depending on your insurance coverage.  Please reach out to your insurance provider with any questions.    If during the course of the call the physician/provider feels a video visit is not appropriate, you will not be charged for this service.\"    Patient has given verbal consent for Video visit? Yes    How would you like to obtain your AVS? Kyaw    Patient would like the video invitation sent by: Text to cell phone: [phone]      Video Start Time: 4:06 PM    Video-Visit Details    Type of service:  Video Visit    Video End Time (time video stopped): 4:50 PM    Originating Location (pt. Location): Home    Distant Location (provider location):  [facility]     Mode of Communication:  Video Conference via Advestigo    Due to the COVID 19 pandemic this visit was converted to a video visit in order to help prevent spread of infection in this patient and the general population.       Assessment/Treatment Plan:      Carlos A Roberts is a 49 year old male:     Type 1 Diabetes with hyperglycemia:    The patient was not diagnosed with diabetes at age 33.  Overall, over the years, his glycemic control has been suboptimal.  He has not known diabetes microvascular disease although the most recent eye exam did indicate possible mild " retinopathy.  Recommendations:  Increase insulin to carbohydrate ratio to 1 unit per 8 g carbohydrates  Administer Humalog 10 to 15 minutes prior to food intake  Given the consistent increase of blood sugar numbers during the night, I recommended to change Lantus to Toujeo, 35 units daily.  Of note that Tresiba is not covered by his insurance.  Document the carbohydrate intake in the insulin dose administered in the tay records  Have the sensor downloaded for my review in 2 weeks  Change needle size to 4 mm  Alternate the site of insulin injection and explore new areas as he might have lipodystrophy and impaired insulin absorption, which explains why the correction insulin appears ineffective in lowering the blood sugar.  I placed a prescription for glucagon and instructed the patient on its use.  Advised him to instruct his children on how to administer glucagon for severe hypoglycemic episodes.  Himself, he can use glucagon for severe hypoglycemia when he cannot hold on food or liquids.  Follow-up labs with his next clinic visit  The patient prefers to transition to Dexcom G6 and he is going to check coverage with his insurance.      Hypothyroidism S/P WHITING ablation   He takes the levothyroxine correctly.  Clinically, he appears euthyroid.  LT4 100 mcg daily for 6 days, 150 on Saturdays   The plan is to check reflex TSH at his next appointment.    Hypercholesterolemia, on 40 mg atorvastatin  Follow-up lipid panel.       Orders Placed This Encounter   Procedures     Continuous Glucose Monitoring >=72 hours PHYS INTERP     Lipid panel reflex to direct LDL Fasting     Albumin Random Urine Quantitative with Creat Ratio     Comprehensive metabolic panel     Hematocrit     Hemoglobin A1c     TSH with free T4 reflex     =====================================================  HPI: Carlos A Roberts is 49 year old male evaluated for type 1 diabetes. He was previously seen by Dr. Mckinney, with the most recent office visit in  12/2019.     In addition to type 1 diabetes, diagnosed at age 33, the patient also has a history of hypercholesterolemia, psoriasis, Graves' disease treated with radioiodine ablation and prior smoking addiction.      Average hemoglobin A1c over the recent years has been around 8%.  The most recent A1c was 7.8%, on 12/6/2019.  His diabetes is not known to be complicated by microvascular disease.    Currently, his diabetes is managed with 35 units of Lantus at 6:30 AM, 1 unit Humalog per 10 g carbohydrate and a correction Humalog of 1 unit per 40 mg above 140.  The patient feels that sometimes, the correction is not effective in bringing his blood sugar down, despite using it repeatedly.  He admits his does not do a very good job with carbohydrate counting.  On average, he takes 3 to 4 units Humalog for food intake, for lunch and dinner.  He does not have breakfast.  His meal schedule has been quite irregular.  If he snacks, generally snacks in the evening, frequently on chips.  The latest he might have a snack is 9:30 PM.  Lunch is anywhere between 11:30 and 12 PM and dinner is around 5-6 PM.  Insulin is injected in the abdominal area and buttocks.  He is using an 8 mm needle size.    Diabetes complications:   Retinopathy: Last eye exam was in 2019. Was told at the last visit he has some retina changes from diabetes.    Nephropathy: No history of microalbuminuria, GFR above 90. On 5 mg lisinopril daily - started for kidney protection.    Neuropathy: None  Hypoglycemia: he gets the tremors, the sweating, palpitations.  The lowest BG number he remembers experiencing was 36. No LOC due to hypoglycemia. He doesn't have glucagon at home. Corrects the hypoglycemic episodes with ghulam bars or juice.  Carlos A has not experienced prior episodes of DKA.  He has been taking 40 mg atorvastatin daily.    He has been wearing the tay sensor since 2019.  I reviewed the sensor records.  Average glucose is 205 with a glucose  variability of 34%, corresponding to a GMI of 8.2%.  Only 34% of the glucose numbers are within target of , 1% are in the mild hypoglycemic range and 65% are above 180.  The sensor reveals a trend towards hyperglycemic episodes beginning around 7 AM and anywhere between 5 and 8 PM.  The blood sugar reaches a perez around 1-2 PM.    .      Medications     HMG CoA Reductase Inhibitors     atorvastatin (LIPITOR) 40 MG tablet       Salicylates     ASPIRIN 81 MG OR TABS             Weight  Wt Readings from Last 4 Encounters:   12/06/19 80.4 kg (177 lb 4.8 oz)   08/02/19 79.7 kg (175 lb 9.6 oz)   03/01/19 79.3 kg (174 lb 14.4 oz)   05/11/18 76.7 kg (169 lb)     Weight trend  Wt Readings from Last 4 Encounters:   12/06/19 80.4 kg (177 lb 4.8 oz)   08/02/19 79.7 kg (175 lb 9.6 oz)   03/01/19 79.3 kg (174 lb 14.4 oz)   05/11/18 76.7 kg (169 lb)       Hypothyroidism  2015 : Graves disease - weight loss, sensation of warms.   WHITING ablation done in 2015.  Dose of levothyroxine is 100 mcg daily 6 days a week and 1-1/2 tablet 1 day a week.  The patient reports being compliant in taking it with water, hrs before breakfast.     Past Medical History:   Diagnosis Date     Hyperlipidemia      Other and unspecified hyperlipidemia      Other psoriasis      Tobacco use disorder      Type I (juvenile type) diabetes mellitus without mention of complication, not stated as uncontrolled     Insulin dependent     Past Surgical History:   Procedure Laterality Date     APPENDECTOMY  Age 10     Allergies   Allergen Reactions     Penicillins          Current Outpatient Medications:      ASPIRIN 81 MG OR TABS, ONE DAILY, Disp: , Rfl:      atorvastatin (LIPITOR) 80 MG tablet, Take 40 mg by mouth daily , Disp: , Rfl:      blood glucose (ACCU-CHEK JANNETH PLUS) test strip, USE TO TEST BLOOD SUGARS FOUR TIMES DAILY OR AS DIRECTED (Patient not taking: Reported on 8/2/2019), Disp: 400 strip, Rfl: 3     blood glucose (ACCU-CHEK GUIDE) test strip, Use  to test blood sugar 4 times daily or as directed. (Patient not taking: Reported on 8/2/2019), Disp: 400 strip, Rfl: 3     blood glucose monitoring (ACCU-CHEK FASTCLIX) lancets, Use to test blood sugar 4 times daily or as directed. (Patient not taking: Reported on 8/2/2019), Disp: 102 each, Rfl: 3     Continuous Blood Gluc  (DEXCOM G6 ) ELIEL, 1 Device continuous (Patient not taking: Reported on 12/6/2019), Disp: 1 Device, Rfl: 0     Continuous Blood Gluc  (FREESTYLE KAR 14 DAY READER) ELIEL, 1 Device continuous, Disp: 1 Device, Rfl: 0     Continuous Blood Gluc Sensor (DEXCOM G6 SENSOR) MISC, 1 each continuous Change every 10 days (Patient not taking: Reported on 12/6/2019), Disp: 9 each, Rfl: 3     Continuous Blood Gluc Sensor (FREESTYLE KAR 14 DAY SENSOR) XX MISC, 1 each continuous, Disp: 6 each, Rfl: 3     insulin glargine (LANTUS SOLOSTAR PEN) 100 UNIT/ML pen, Inject 39 units daily plus 2 units for priming, Disp: 45 mL, Rfl: 3     insulin lispro (HUMALOG KWIKPEN) 100 UNIT/ML (1 unit dial) pen, USE AS DIRECTED FOR CARBOHYDRATE AND SLIDING SCALE COVERAGE. MAX DAILY DOSE 50 UNITS, Disp: 15 mL, Rfl: 3     insulin pen needle (B-D U/F) 31G X 8 MM, Use 4-5 daily or as directed., Disp: 100 each, Rfl: 3     levothyroxine (SYNTHROID/LEVOTHROID) 100 MCG tablet, 1 tab Monday thru Saturday, 1.5 tabs on Sundays, Disp: 96 tablet, Rfl: 2     LISINOPRIL 5 MG OR TABS, ONE DAILY, Disp: 1 month, Rfl: 0     Ostomy Supplies (SKIN TAC ADHESIVE BARRIER WIPE) MISC, 1 each every 14 days, Disp: 50 each, Rfl: 3     PARoxetine (PAXIL) 10 MG tablet, Take 1 tablet (10 mg) by mouth every morning, Disp: 30 tablet, Rfl: 0    Review of Systems   Systemic symptoms: no fatigue, weight stable  Eye symptoms: No eye symptoms.  Otolaryngeal symptoms: No otolaryngeal symptoms.  Breast symptoms: No breast symptoms.  Cardiovascular symptoms: No cardiovascular symptoms.    Pulmonary symptoms: he has had a mostly dry cough for 3  months, intermittent; mostly in am and mild, no SOB  Gastrointestinal symptoms: occasional heartburns   Genitourinary symptoms: No genitourinary symptoms.  Endocrine symptoms: No endocrine symptoms.    Hematologic symptoms: No hematologic symptoms.  Musculoskeletal symptoms: No musculoskeletal symptoms.  Neurological symptoms: No neurological symptoms.  Psychological symptoms: No psychological symptoms.    Skin symptoms: No skin symptoms.    Past Medical History:   Diagnosis Date     Hyperlipidemia      Other and unspecified hyperlipidemia      Other psoriasis      Tobacco use disorder      Type I (juvenile type) diabetes mellitus without mention of complication, not stated as uncontrolled     Insulin dependent       Past Surgical History:   Procedure Laterality Date     APPENDECTOMY  Age 10       Family History   Problem Relation Age of Onset     Heart Disease Paternal Grandmother      Heart Disease Paternal Grandfather      Social History    with 2 chlildren. Quit smoking in 2007. Former smoker for 20 years, 1/2 PPD. Denies using   Occupation: .     PE:  General appearance: he is well-developed, well-nourished, and in no distress.  Eyes: conjunctivae and extraocular motions are normal. No lid lag, no stare, no proptosis.  HENT: no visible thyromegaly   Neurologic: no resting tremor  Psychiatric: affect and judgment normal   Skin: no lesions on exposed skin     In House Labs reviewed:  Lab Results   Component Value Date    A1C 7.8 (A) 12/06/2019    A1C 7.8 (A) 08/02/2019    A1C 8.2 (A) 03/01/2019    A1C 8.5 (H) 05/11/2018    A1C 8.7 (A) 05/11/2018       Hemoglobin   Date Value Ref Range Status   11/17/2017 15.8 13.3 - 17.7 g/dL Final     Hematocrit   Date Value Ref Range Status   03/01/2019 47.0 40.0 - 53.0 % Final     Cholesterol   Date Value Ref Range Status   11/17/2017 183 <200 mg/dL Final     Cholesterol/HDL Ratio   Date Value Ref Range Status   07/22/2015 3.5 0.0 - 5.0 Final     HDL  Cholesterol   Date Value Ref Range Status   11/17/2017 62 >39 mg/dL Final     LDL Cholesterol Calculated   Date Value Ref Range Status   11/17/2017 81 <100 mg/dL Final     Comment:     Desirable:       <100 mg/dl     LDL Cholesterol Direct   Date Value Ref Range Status   03/01/2019 162 (H) <100 mg/dL Final     Comment:     Above desirable:  100-129 mg/dl  Borderline High:  130-159 mg/dL  High:             160-189 mg/dL  Very high:       >189 mg/dl       VLDL-Cholesterol   Date Value Ref Range Status   07/22/2015 15 0 - 30 mg/dL Final     Triglycerides   Date Value Ref Range Status   11/17/2017 200 (H) <150 mg/dL Final     Comment:     Borderline high:  150-199 mg/dl  High:             200-499 mg/dl  Very high:       >499 mg/dl  Non Fasting       Albumin Urine mg/L   Date Value Ref Range Status   03/01/2019 8 mg/L Final     TSH   Date Value Ref Range Status   03/01/2019 3.05 0.40 - 4.00 mU/L Final         Last Basic Metabolic Panel:    Sodium   Date Value Ref Range Status   03/01/2019 137 133 - 144 mmol/L Final     Potassium   Date Value Ref Range Status   03/01/2019 4.0 3.4 - 5.3 mmol/L Final     Chloride   Date Value Ref Range Status   03/01/2019 104 94 - 109 mmol/L Final     Calcium   Date Value Ref Range Status   03/01/2019 9.7 8.5 - 10.1 mg/dL Final     Carbon Dioxide   Date Value Ref Range Status   03/01/2019 28 20 - 32 mmol/L Final     Urea Nitrogen   Date Value Ref Range Status   03/01/2019 11 7 - 30 mg/dL Final     Creatinine   Date Value Ref Range Status   03/01/2019 0.68 0.66 - 1.25 mg/dL Final     GFR Estimate   Date Value Ref Range Status   03/01/2019 >90 >60 mL/min/[1.73_m2] Final     Comment:     Non  GFR Calc  Starting 12/18/2018, serum creatinine based estimated GFR (eGFR) will be   calculated using the Chronic Kidney Disease Epidemiology Collaboration   (CKD-EPI) equation.       Glucose   Date Value Ref Range Status   03/01/2019 175 (H) 70 - 99 mg/dL Final     Comment:     Non  Fasting       AST   Date Value Ref Range Status   11/28/2016 16 0 - 45 U/L Final     ALT   Date Value Ref Range Status   03/01/2019 19 0 - 70 U/L Final     Albumin Urine mg/g Cr   Date Value Ref Range Status   03/01/2019 5.61 0 - 17 mg/g Cr Final

## 2020-04-15 ENCOUNTER — TELEPHONE (OUTPATIENT)
Dept: ENDOCRINOLOGY | Facility: CLINIC | Age: 50
End: 2020-04-15

## 2020-04-15 NOTE — TELEPHONE ENCOUNTER
1st Attempt LVM for Carlos A to call back to schedule his 4 month follow up appointment with Dr Lutz along with a lab appointment prior. I asked Carlos A to please call [phone] to schedule. Patient is due to be seen in August 2020.     Jung Mckeon  Procedure , [place]  [facility]

## 2020-08-11 ENCOUNTER — VIRTUAL VISIT (OUTPATIENT)
Dept: ENDOCRINOLOGY | Facility: CLINIC | Age: 50
End: 2020-08-11
Payer: COMMERCIAL

## 2020-08-11 DIAGNOSIS — E89.0 POSTABLATIVE HYPOTHYROIDISM: ICD-10-CM

## 2020-08-11 DIAGNOSIS — E78.00 ELEVATED CHOLESTEROL: ICD-10-CM

## 2020-08-11 DIAGNOSIS — E10.65 TYPE 1 DIABETES MELLITUS WITH HYPERGLYCEMIA (H): Primary | ICD-10-CM

## 2020-08-11 PROCEDURE — 99214 OFFICE O/P EST MOD 30 MIN: CPT | Mod: GT | Performed by: INTERNAL MEDICINE

## 2020-08-11 PROCEDURE — 95251 CONT GLUC MNTR ANALYSIS I&R: CPT | Performed by: INTERNAL MEDICINE

## 2020-08-11 RX ORDER — LOSARTAN POTASSIUM 25 MG/1
1 TABLET ORAL DAILY
COMMUNITY
Start: 2020-07-28

## 2020-08-11 RX ORDER — LEVOTHYROXINE SODIUM 112 UG/1
1 TABLET ORAL DAILY
COMMUNITY
Start: 2020-07-28

## 2020-08-11 NOTE — LETTER
"    8/11/2020         RE: Carlos A Roberts  [address] [facility] [address]        Dear Colleague,    Thank you for referring your patient, Carlos A Roberts, to the [facility]. Please see a copy of my visit note below.    Carlos A Roberts is a 49 year old male who is being evaluated via a billable video visit.      The patient has been notified of following:     \"This video visit will be conducted via a call between you and your physician/provider. We have found that certain health care needs can be provided without the need for an in-person physical exam.  This service lets us provide the care you need with a video conversation.  If a prescription is necessary we can send it directly to your pharmacy.  If lab work is needed we can place an order for that and you can then stop by our lab to have the test done at a later time.    Video visits are billed at different rates depending on your insurance coverage.  Please reach out to your insurance provider with any questions.    If during the course of the call the physician/provider feels a video visit is not appropriate, you will not be charged for this service.\"    Patient has given verbal consent for Video visit? Yes  How would you like to obtain your AVS? MyChart  If you are dropped from the video visit, the video invite should be resent to: Text to cell phone: [phone]   Will anyone else be joining your video visit? No    Nanette Crawford CMA  Adult Endocrinology  [facility]          Carlos A Roberts is a 49 year old male who is being evaluated via a billable video visit.      The patient has been notified of following:     \"This video visit will be conducted via a call between you and your physician/provider. We have found that certain health care needs can be provided without the need for an in-person physical exam.  This service lets us provide the care you need with a video conversation.  If a " "prescription is necessary we can send it directly to your pharmacy.  If lab work is needed we can place an order for that and you can then stop by our lab to have the test done at a later time.    Video visits are billed at different rates depending on your insurance coverage.  Please reach out to your insurance provider with any questions.    If during the course of the call the physician/provider feels a video visit is not appropriate, you will not be charged for this service.\"    Patient has given verbal consent for Video visit? Yes    How would you like to obtain your AVS? Kyaw    Patient would like the video invitation sent by: Text to cell phone: [phone]      Video Start Time: 4:06 PM    Video-Visit Details    Type of service:  Video Visit  Video call duration 24 minutes    Originating Location (pt. Location): Home    Distant Location (provider location):  [facility]     Mode of Communication:  Video Conference via Doximity    Due to the COVID 19 pandemic this visit was converted to a video visit in order to help prevent spread of infection in this patient and the general population.       Assessment/Treatment Plan:      Carlos A Roberts is a 49 year old male:     Type 1 Diabetes with hyperglycemia:    The patient was diagnosed with diabetes at age 33.  Overall, over the years, his glycemic control has been suboptimal.  His diabetes is known to be complicated by mild retinopathy.    Recommendations:  Upgrade to tay freestyle 2.  Counseled on the option of setting up alarms for high or low blood glucose numbers.  Document the carbohydrate intake in the insulin dose administered in the tay pat  Continue to use an insulin to carbohydrate ratio of 1 unit per 7 g carbohydrates.  If the 2-hour postmeal blood sugar is higher than 20 points compared with the pre-meal blood sugar, he should change insulin to carbohydrate ratio to 1 unit per 6 g carbohydrates.  Decrease the dose of Toujeo to " 25 units  Continue to use a Humalog correction scale of 1 unit per 40 above 140 for the pre-meal and bedtime blood sugar.  Counseled on the fact that it takes 2 hours for the correction dose to have a full effect on the blood sugar.  Long-term, discussed about the option of transitioning to an insulin pump and the patient is going to check his insurance coverage.    Hypothyroidism S/P WHITING ablation   He takes the levothyroxine correctly.  Clinically, he appears euthyroid.  The dose of levothyroxine was increased to 112 mcg daily by his primary care provider, in July this year.   Follow-up TFTs     Hypercholesterolemia, on 40 mg atorvastatin  Controlled.    No orders of the defined types were placed in this encounter.    =====================================================  HPI: Carlos A Roberts is 49 year old male evaluated for type 1 diabetes. He was previously seen by Dr. Mckinney and he established care with me in April 2020.  In addition to type 1 diabetes, diagnosed at age 33, the patient also has a history of hypercholesterolemia, psoriasis, Graves' disease treated with radioiodine ablation and prior smoking addiction.      Average hemoglobin A1c over the recent years has been around 8%.  The most recent A1c was 7.8%, in July 2020, checked by his primary care provider.    Shortly after transitioning from morning Lantus to Toujeo, at his last visit with me, the patient developed hypoglycemic episodes during the night.  The dose of Toujeo was gradually decreased to the current dose of 31 units.  He continues to take 1 unit Humalog per 7 g carbohydrates and use a correction scale of 1 per 40 above 140.  Most of the days, he ends up having 2 meals.  Average Humalog dose for meal is 4 units.  He states that despite decreasing the dose of Toujeo, he continues to have intermittent nocturnal hypoglycemic episodes.  In order to avoid them, he tries to go to bed with a blood sugar above 150.    Diabetes complications:    Retinopathy: Last eye exam was in 11/2019. Was told at the last visit he has some retina changes from diabetes.    Nephropathy: No history of microalbuminuria, GFR above 90. On 25 mg losartan daily - started for kidney protection.    Neuropathy: None  Hypoglycemia: he gets the tremors, the sweating, palpitations.  The lowest BG number he remembers experiencing was 36. No LOC due to hypoglycemia. He doesn't have glucagon at home. Corrects the hypoglycemic episodes with ghulam bars or juice.  Carlos A has not experienced prior episodes of DKA.  He has been taking 40 mg atorvastatin daily.    He has been wearing the tay sensor since 2019.  I reviewed the sensor records.  Average blood glucose is 130, corresponding to a GMI of 8.2%, with a glucose variability of 27%.  33% of the glucose numbers are within target of  and 19% are above 250.  The sensor reveals high postprandial spikes.  Patient admits he has a hard time administering insulin 15 to 20 minutes prior to eating.  He is disappointed by the fact that administering insulin to correct the hyperglycemia does not have an immediate effect.     .        Medications     HMG CoA Reductase Inhibitors     atorvastatin (LIPITOR) 40 MG tablet       Salicylates     ASPIRIN 81 MG OR TABS             Weight  Wt Readings from Last 4 Encounters:   12/06/19 80.4 kg (177 lb 4.8 oz)   08/02/19 79.7 kg (175 lb 9.6 oz)   03/01/19 79.3 kg (174 lb 14.4 oz)   05/11/18 76.7 kg (169 lb)     Weight trend  Wt Readings from Last 4 Encounters:   12/06/19 80.4 kg (177 lb 4.8 oz)   08/02/19 79.7 kg (175 lb 9.6 oz)   03/01/19 79.3 kg (174 lb 14.4 oz)   05/11/18 76.7 kg (169 lb)       Hypothyroidism  2015 : Graves disease   WHITING ablation done in 2015.  He had lab work done in July by his primary care provider and the dose of levothyroxine was increased from 100 to 112 mcg daily.  He confirms he has been taking levothyroxine correctly, on empty stomach, hours before having anything to  eat.    Past Medical History:   Diagnosis Date     Hyperlipidemia      Other and unspecified hyperlipidemia      Other psoriasis      Tobacco use disorder      Type I (juvenile type) diabetes mellitus without mention of complication, not stated as uncontrolled     Insulin dependent     Past Surgical History:   Procedure Laterality Date     APPENDECTOMY  Age 10     Allergies   Allergen Reactions     Penicillins          Current Outpatient Medications:      atorvastatin (LIPITOR) 40 MG tablet, Take 1 tablet (40 mg) by mouth daily, Disp: 90 tablet, Rfl: 3     blood glucose (ACCU-CHEK GUIDE) test strip, Use to test blood sugar 4 times daily or as directed., Disp: 400 strip, Rfl: 3     blood glucose monitoring (ACCU-CHEK FASTCLIX) lancets, Use to test blood sugar 4 times daily or as directed., Disp: 102 each, Rfl: 3     Continuous Blood Gluc Sensor (FREESTYLE KAR 14 DAY SENSOR) XX MISC, 1 each continuous, Disp: 6 each, Rfl: 3     insulin glargine U-300 (TOUJEO SOLOSTAR) 300 UNIT/ML (1 units dial) pen, Inject 35 Units Subcutaneous At Bedtime, Disp: 42 mL, Rfl: 3     insulin lispro (HUMALOG KWIKPEN) 100 UNIT/ML (1 unit dial) pen, USE AS DIRECTED FOR CARBOHYDRATE AND SLIDING SCALE COVERAGE. MAX DAILY DOSE 50 UNITS, Disp: 15 mL, Rfl: 3     insulin pen needle (BD QAMAR U/F) 32G X 4 MM miscellaneous, Use 4 pen needles daily or as directed., Disp: 360 each, Rfl: 3     levothyroxine (SYNTHROID/LEVOTHROID) 112 MCG tablet, Take 1 tablet by mouth daily, Disp: , Rfl:      losartan (COZAAR) 25 MG tablet, Take 1 tablet by mouth daily, Disp: , Rfl:      PARoxetine (PAXIL) 10 MG tablet, Take 1 tablet (10 mg) by mouth every morning, Disp: 30 tablet, Rfl: 0     ASPIRIN 81 MG OR TABS, ONE DAILY, Disp: , Rfl:      blood glucose (ACCU-CHEK JANNETH PLUS) test strip, USE TO TEST BLOOD SUGARS FOUR TIMES DAILY OR AS DIRECTED (Patient not taking: Reported on 8/2/2019), Disp: 400 strip, Rfl: 3     Continuous Blood Gluc  (DEXCOM G6 )  ELIEL, 1 Device continuous (Patient not taking: Reported on 12/6/2019), Disp: 1 Device, Rfl: 0     Continuous Blood Gluc  (FREESTYLE KAR 14 DAY READER) ELIEL, 1 Device continuous, Disp: 1 Device, Rfl: 0     glucagon (GLUCAGON EMERGENCY) 1 MG kit, Inject 1 mg subcutaneously or intramuscularly., Disp: 1 each, Rfl: 3     Ostomy Supplies (SKIN TAC ADHESIVE BARRIER WIPE) MISC, 1 each every 14 days, Disp: 50 each, Rfl: 3    Review of Systems   Systemic symptoms: no fatigue; weight up, less than 10 lbs   Eye symptoms: No eye symptoms.  Otolaryngeal symptoms: No otolaryngeal symptoms.  Breast symptoms: No breast symptoms.  Cardiovascular symptoms: No cardiovascular symptoms.    Pulmonary symptoms: no SOB  Gastrointestinal symptoms: occasional heartburns   Genitourinary symptoms: No genitourinary symptoms.  Endocrine symptoms: No endocrine symptoms.    Hematologic symptoms: No hematologic symptoms.  Musculoskeletal symptoms: No musculoskeletal symptoms.  Neurological symptoms: No neurological symptoms.  Psychological symptoms: No psychological symptoms.    Skin symptoms: No skin symptoms.    Past Medical History:   Diagnosis Date     Hyperlipidemia      Other and unspecified hyperlipidemia      Other psoriasis      Tobacco use disorder      Type I (juvenile type) diabetes mellitus without mention of complication, not stated as uncontrolled     Insulin dependent       Past Surgical History:   Procedure Laterality Date     APPENDECTOMY  Age 10       Family History   Problem Relation Age of Onset     Heart Disease Paternal Grandmother      Heart Disease Paternal Grandfather      Social History    with 2 chlildren. Quit smoking in 2007. Former smoker for 20 years, 1/2 PPD. Denies using   Occupation: .     PE:  General appearance: he is well-developed, well-nourished, and in no distress.  Eyes: conjunctivae and extraocular motions are normal. No lid lag, no stare, no proptosis.  HENT: no visible  thyromegaly   Neurologic: no resting tremor  Psychiatric: affect and judgment normal   Skin: no lesions on exposed skin     In House Labs reviewed:  Lab Results   Component Value Date    A1C 7.8 (A) 12/06/2019    A1C 7.8 (A) 08/02/2019    A1C 8.2 (A) 03/01/2019    A1C 8.5 (H) 05/11/2018    A1C 8.7 (A) 05/11/2018       Hemoglobin   Date Value Ref Range Status   11/17/2017 15.8 13.3 - 17.7 g/dL Final     Hematocrit   Date Value Ref Range Status   03/01/2019 47.0 40.0 - 53.0 % Final     Cholesterol   Date Value Ref Range Status   11/17/2017 183 <200 mg/dL Final     Cholesterol/HDL Ratio   Date Value Ref Range Status   07/22/2015 3.5 0.0 - 5.0 Final     HDL Cholesterol   Date Value Ref Range Status   11/17/2017 62 >39 mg/dL Final     LDL Cholesterol Calculated   Date Value Ref Range Status   11/17/2017 81 <100 mg/dL Final     Comment:     Desirable:       <100 mg/dl     LDL Cholesterol Direct   Date Value Ref Range Status   03/01/2019 162 (H) <100 mg/dL Final     Comment:     Above desirable:  100-129 mg/dl  Borderline High:  130-159 mg/dL  High:             160-189 mg/dL  Very high:       >189 mg/dl       VLDL-Cholesterol   Date Value Ref Range Status   07/22/2015 15 0 - 30 mg/dL Final     Triglycerides   Date Value Ref Range Status   11/17/2017 200 (H) <150 mg/dL Final     Comment:     Borderline high:  150-199 mg/dl  High:             200-499 mg/dl  Very high:       >499 mg/dl  Non Fasting       Albumin Urine mg/L   Date Value Ref Range Status   03/01/2019 8 mg/L Final     TSH   Date Value Ref Range Status   03/01/2019 3.05 0.40 - 4.00 mU/L Final         Last Basic Metabolic Panel:    Sodium   Date Value Ref Range Status   03/01/2019 137 133 - 144 mmol/L Final     Potassium   Date Value Ref Range Status   03/01/2019 4.0 3.4 - 5.3 mmol/L Final     Chloride   Date Value Ref Range Status   03/01/2019 104 94 - 109 mmol/L Final     Calcium   Date Value Ref Range Status   03/01/2019 9.7 8.5 - 10.1 mg/dL Final     Carbon  Dioxide   Date Value Ref Range Status   03/01/2019 28 20 - 32 mmol/L Final     Urea Nitrogen   Date Value Ref Range Status   03/01/2019 11 7 - 30 mg/dL Final     Creatinine   Date Value Ref Range Status   03/01/2019 0.68 0.66 - 1.25 mg/dL Final     GFR Estimate   Date Value Ref Range Status   03/01/2019 >90 >60 mL/min/[1.73_m2] Final     Comment:     Non  GFR Calc  Starting 12/18/2018, serum creatinine based estimated GFR (eGFR) will be   calculated using the Chronic Kidney Disease Epidemiology Collaboration   (CKD-EPI) equation.       Glucose   Date Value Ref Range Status   03/01/2019 175 (H) 70 - 99 mg/dL Final     Comment:     Non Fasting       AST   Date Value Ref Range Status   11/28/2016 16 0 - 45 U/L Final     ALT   Date Value Ref Range Status   03/01/2019 19 0 - 70 U/L Final     Albumin Urine mg/g Cr   Date Value Ref Range Status   03/01/2019 5.61 0 - 17 mg/g Cr Final       Again, thank you for allowing me to participate in the care of your patient.        Sincerely,        Nevin Lutz MD

## 2020-08-11 NOTE — PROGRESS NOTES
"Carlos A Roberts is a 49 year old male who is being evaluated via a billable video visit.      The patient has been notified of following:     \"This video visit will be conducted via a call between you and your physician/provider. We have found that certain health care needs can be provided without the need for an in-person physical exam.  This service lets us provide the care you need with a video conversation.  If a prescription is necessary we can send it directly to your pharmacy.  If lab work is needed we can place an order for that and you can then stop by our lab to have the test done at a later time.    Video visits are billed at different rates depending on your insurance coverage.  Please reach out to your insurance provider with any questions.    If during the course of the call the physician/provider feels a video visit is not appropriate, you will not be charged for this service.\"    Patient has given verbal consent for Video visit? Yes  How would you like to obtain your AVS? MyChart  If you are dropped from the video visit, the video invite should be resent to: Text to cell phone: [phone]   Will anyone else be joining your video visit? No    Nanette Crawford [facility]  Adult Endocrinology  [facility]        "

## 2020-08-17 ENCOUNTER — MYC MEDICAL ADVICE (OUTPATIENT)
Dept: ENDOCRINOLOGY | Facility: CLINIC | Age: 50
End: 2020-08-17

## 2020-08-17 DIAGNOSIS — E10.65 TYPE 1 DIABETES MELLITUS WITH HYPERGLYCEMIA (H): Primary | ICD-10-CM

## 2020-08-18 NOTE — TELEPHONE ENCOUNTER
Refill completed per Verbal Order from Dr. Lutz.    Kasey Carrero LPN  Diabetes Clinic Coordinator   Adult Endocrinology and Pediatric Specialty Clinics  [facility]

## 2020-10-19 DIAGNOSIS — E11.9 DIABETES MELLITUS, TYPE 2 (H): ICD-10-CM

## 2020-10-19 RX ORDER — INSULIN LISPRO 100 [IU]/ML
INJECTION, SOLUTION INTRAVENOUS; SUBCUTANEOUS
Qty: 15 ML | Refills: 3 | Status: SHIPPED | OUTPATIENT
Start: 2020-10-19

## 2020-10-19 NOTE — TELEPHONE ENCOUNTER
HUMALOG 100 U/ML KWIK PEN INJ 3ML      Insulin - refilled per clinic  Last Virtual Visit: 8/11/20  NV 12/15/20

## 2020-11-09 ENCOUNTER — MYC REFILL (OUTPATIENT)
Dept: ENDOCRINOLOGY | Facility: CLINIC | Age: 50
End: 2020-11-09

## 2020-11-09 DIAGNOSIS — E10.65 TYPE 1 DIABETES MELLITUS WITH HYPERGLYCEMIA (H): ICD-10-CM

## 2020-11-16 ENCOUNTER — HEALTH MAINTENANCE LETTER (OUTPATIENT)
Age: 50
End: 2020-11-16

## 2020-11-30 DIAGNOSIS — E10.65 TYPE 1 DIABETES MELLITUS WITH HYPERGLYCEMIA (H): ICD-10-CM

## 2020-12-15 ENCOUNTER — VIRTUAL VISIT (OUTPATIENT)
Dept: ENDOCRINOLOGY | Facility: CLINIC | Age: 50
End: 2020-12-15
Payer: COMMERCIAL

## 2020-12-15 DIAGNOSIS — E78.00 HYPERCHOLESTEROLEMIA: ICD-10-CM

## 2020-12-15 DIAGNOSIS — E89.0 POSTABLATIVE HYPOTHYROIDISM: ICD-10-CM

## 2020-12-15 DIAGNOSIS — E10.65 TYPE 1 DIABETES MELLITUS WITH HYPERGLYCEMIA (H): Primary | ICD-10-CM

## 2020-12-15 PROCEDURE — 99214 OFFICE O/P EST MOD 30 MIN: CPT | Mod: GT | Performed by: INTERNAL MEDICINE

## 2020-12-15 PROCEDURE — 95251 CONT GLUC MNTR ANALYSIS I&R: CPT | Performed by: INTERNAL MEDICINE

## 2020-12-15 NOTE — LETTER
12/15/2020         RE: Carlos A Roberts  [address] [facility] [address]        Dear Colleague,    Thank you for referring your patient, Carlos A Roberts, to the [facility]. Please see a copy of my visit note below.    Video-Visit Details    Type of service:  Video Visit    Video call duration: 25 minutes. Ended at 11:41 am.    Originating Location (pt. Location): Home  Distant Location (provider location):  [facility]   Platform used for Video Visit: Doximity     Due to the COVID 19 pandemic this visit was converted to a video visit in order to help prevent spread of infection in this patient and the general population.     Assessment/Treatment Plan:      Carlos A Roberts is a 50 year old male:     Type 1 Diabetes with hyperglycemia:    The patient was diagnosed with diabetes at age 33.  Overall, recently, his glycemic control has been fairly good.  His diabetes is known to be complicated by mild retinopathy.    Recommendations:  I think he is going to benefit from transitioning to an insulin pump.  Recommended to schedule an appointment with the diabetes educator to review Tandem insulin pump use.   Unclear whether or not he requires a higher dose of Toujeo, given the reported spontaneous increase of the blood sugar in the morning hours   He definitely uses a more aggressive correction and this can explain some of the reported hypoglycemic episodes.  I recommended to use a correction scale of 1 unit per 40 mg above 120 or above 100.  Schedule fasting lab work: Hematocrit, CMP, urine microalbumin and A1c    Hypothyroidism S/P WHITING ablation   He takes the levothyroxine correctly.  Clinically, he appears euthyroid.   Follow-up TFTs     Hypercholesterolemia, on 40 mg atorvastatin  Follow-up lipid panel    Orders Placed This Encounter   Procedures     AMBULATORY ADULT DIABETES EDUCATOR REFERRAL     Addendum:  The patient sent the CGM readings following the visit.   Average glucose on the , corresponding to a GMI of 7.2%, with a glucose variability of 70.  A [employer] message was sent to the patient.       =====================================================  HPI: Carlos A Roberts is 50 year old male evaluated for type 1 diabetes. He was previously seen by Dr. Mckinney and he established care with me in April 2020.  In addition to type 1 diabetes, diagnosed at age 33, the patient also has a history of hypercholesterolemia, psoriasis, Graves' disease treated with radioiodine ablation and prior smoking addiction.      Average hemoglobin A1c over the recent years has been around 8%.      Since his last visit here, he has increase the dose of Toujeo to 27 units.  He finds the tay sensor alarm very helpful.  Humalog is administered at 1 unit per 7 g carbohydrates.  In general, the first meal of the day is around 11 AM-12 PM and the second meal is around 6 AM (dinner).  The average NovoLog dose for meals is 7 to 8 units.  Quite frequently, his blood sugar is elevated already morning and he takes 4-5 units NovoLog to bring it down, as correction.  He frequently snacks after dinner, around 8-9 PM.  He has noticed a spontaneous increase in the blood sugar in the morning, in the absence of food intake, with no obvious reason.  If he does take too much NovoLog at bedtime, he sometimes develops a low blood sugar during the night.  The correction scale he has been using is more aggressive than the recommended correction of 1 per 40 above 140.    Diabetes complications:   Retinopathy: Last eye exam was in 11/2019. Was told at the last visit he has some retina changes from diabetes.    Nephropathy: No history of microalbuminuria, GFR above 90. On 25 mg losartan daily - started for kidney protection.    Neuropathy: None  Hypoglycemia: he gets the tremors, the sweating, palpitations.  The lowest BG number he remembers experiencing was 36. No LOC due to hypoglycemia. He doesn't have  glucagon at home. Corrects the hypoglycemic episodes with ghulam bars or juice.  Carlos A has not experienced prior episodes of DKA.  He has been taking 40 mg atorvastatin daily.    Medications     HMG CoA Reductase Inhibitors     atorvastatin (LIPITOR) 40 MG tablet         Vitamin D     Weight  Wt Readings from Last 4 Encounters:   12/06/19 80.4 kg (177 lb 4.8 oz)   08/02/19 79.7 kg (175 lb 9.6 oz)   03/01/19 79.3 kg (174 lb 14.4 oz)   05/11/18 76.7 kg (169 lb)     Weight trend  Wt Readings from Last 4 Encounters:   12/06/19 80.4 kg (177 lb 4.8 oz)   08/02/19 79.7 kg (175 lb 9.6 oz)   03/01/19 79.3 kg (174 lb 14.4 oz)   05/11/18 76.7 kg (169 lb)       Hypothyroidism  2015 : Graves disease   WHITING ablation done in 2015.  He had lab work done in July by his primary care provider and the dose of levothyroxine was increased from 100 to 112 mcg daily.  He confirms he has been taking levothyroxine correctly, on empty stomach, hours before having anything to eat.    Past Medical History:   Diagnosis Date     Hyperlipidemia      Other and unspecified hyperlipidemia      Other psoriasis      Tobacco use disorder      Type I (juvenile type) diabetes mellitus without mention of complication, not stated as uncontrolled     Insulin dependent     Past Surgical History:   Procedure Laterality Date     APPENDECTOMY  Age 10     Allergies   Allergen Reactions     Penicillins          Current Outpatient Medications:      atorvastatin (LIPITOR) 40 MG tablet, Take 1 tablet (40 mg) by mouth daily, Disp: 90 tablet, Rfl: 3     blood glucose (ACCU-CHEK GUIDE) test strip, Use to test blood sugar 4 times daily or as directed., Disp: 400 strip, Rfl: 3     blood glucose monitoring (ACCU-CHEK FASTCLIX) lancets, Use to test blood sugar 4 times daily or as directed., Disp: 102 each, Rfl: 3     Continuous Blood Gluc  (FREESTYLE KAR 2 READER SYSTM) ELIEL, 1 Device continuous, Disp: 1 each, Rfl: 0     Continuous Blood Gluc Sensor (FREESTYLE KAR  2 SENSOR SYSTM) MISC, 1 each every 14 days, Disp: 6 each, Rfl: 3     glucagon (GLUCAGON EMERGENCY) 1 MG kit, Inject 1 mg subcutaneously or intramuscularly., Disp: 1 each, Rfl: 3     insulin glargine U-300 (TOUJEO SOLOSTAR) 300 UNIT/ML (1 units dial) pen, Inject 35 Units Subcutaneous At Bedtime, Disp: 42 mL, Rfl: 3     insulin lispro (HUMALOG KWIKPEN) 100 UNIT/ML (1 unit dial) KWIKPEN, USE AS DIRECTED FOR CARBOHYDRATE AND SLIDING SCALE COVERAGE, MAX DAILY DOSE IS 50 UNITS, Disp: 15 mL, Rfl: 3     insulin pen needle (BD QAMAR U/F) 32G X 4 MM miscellaneous, Use 4 pen needles daily or as directed., Disp: 360 each, Rfl: 3     levothyroxine (SYNTHROID/LEVOTHROID) 112 MCG tablet, Take 1 tablet by mouth daily, Disp: , Rfl:      losartan (COZAAR) 25 MG tablet, Take 1 tablet by mouth daily, Disp: , Rfl:      PARoxetine (PAXIL) 10 MG tablet, Take 1 tablet (10 mg) by mouth every morning, Disp: 30 tablet, Rfl: 0    Review of Systems   Systemic symptoms: no fatigue; weight up, less than 10 lbs       Past Medical History:   Diagnosis Date     Hyperlipidemia      Other and unspecified hyperlipidemia      Other psoriasis      Tobacco use disorder      Type I (juvenile type) diabetes mellitus without mention of complication, not stated as uncontrolled     Insulin dependent       Past Surgical History:   Procedure Laterality Date     APPENDECTOMY  Age 10       Family History   Problem Relation Age of Onset     Heart Disease Paternal Grandmother      Heart Disease Paternal Grandfather      Social History    with 2 chlildren. Quit smoking in 2007. Former smoker for 20 years, 1/2 PPD. Denies using   Occupation: .     PE:  General appearance: he is well-developed, well-nourished, and in no distress.  Eyes: conjunctivae and extraocular motions are normal. No lid lag, no stare, no proptosis.  HENT: no visible thyromegaly   Neurologic: no resting tremor  Psychiatric: affect and judgment normal   Skin: no lesions on exposed  skin     In House Labs reviewed:  Lab Results   Component Value Date    A1C 7.8 (A) 12/06/2019    A1C 7.8 (A) 08/02/2019    A1C 8.2 (A) 03/01/2019    A1C 8.5 (H) 05/11/2018    A1C 8.7 (A) 05/11/2018       Hemoglobin   Date Value Ref Range Status   11/17/2017 15.8 13.3 - 17.7 g/dL Final     Hematocrit   Date Value Ref Range Status   03/01/2019 47.0 40.0 - 53.0 % Final     Cholesterol   Date Value Ref Range Status   11/17/2017 183 <200 mg/dL Final     Cholesterol/HDL Ratio   Date Value Ref Range Status   07/22/2015 3.5 0.0 - 5.0 Final     HDL Cholesterol   Date Value Ref Range Status   11/17/2017 62 >39 mg/dL Final     LDL Cholesterol Calculated   Date Value Ref Range Status   11/17/2017 81 <100 mg/dL Final     Comment:     Desirable:       <100 mg/dl     LDL Cholesterol Direct   Date Value Ref Range Status   03/01/2019 162 (H) <100 mg/dL Final     Comment:     Above desirable:  100-129 mg/dl  Borderline High:  130-159 mg/dL  High:             160-189 mg/dL  Very high:       >189 mg/dl       VLDL-Cholesterol   Date Value Ref Range Status   07/22/2015 15 0 - 30 mg/dL Final     Triglycerides   Date Value Ref Range Status   11/17/2017 200 (H) <150 mg/dL Final     Comment:     Borderline high:  150-199 mg/dl  High:             200-499 mg/dl  Very high:       >499 mg/dl  Non Fasting       Albumin Urine mg/L   Date Value Ref Range Status   03/01/2019 8 mg/L Final     TSH   Date Value Ref Range Status   03/01/2019 3.05 0.40 - 4.00 mU/L Final         Last Basic Metabolic Panel:    Sodium   Date Value Ref Range Status   03/01/2019 137 133 - 144 mmol/L Final     Potassium   Date Value Ref Range Status   03/01/2019 4.0 3.4 - 5.3 mmol/L Final     Chloride   Date Value Ref Range Status   03/01/2019 104 94 - 109 mmol/L Final     Calcium   Date Value Ref Range Status   03/01/2019 9.7 8.5 - 10.1 mg/dL Final     Carbon Dioxide   Date Value Ref Range Status   03/01/2019 28 20 - 32 mmol/L Final     Urea Nitrogen   Date Value Ref Range  "Status   03/01/2019 11 7 - 30 mg/dL Final     Creatinine   Date Value Ref Range Status   03/01/2019 0.68 0.66 - 1.25 mg/dL Final     GFR Estimate   Date Value Ref Range Status   03/01/2019 >90 >60 mL/min/[1.73_m2] Final     Comment:     Non  GFR Calc  Starting 12/18/2018, serum creatinine based estimated GFR (eGFR) will be   calculated using the Chronic Kidney Disease Epidemiology Collaboration   (CKD-EPI) equation.       Glucose   Date Value Ref Range Status   03/01/2019 175 (H) 70 - 99 mg/dL Final     Comment:     Non Fasting       AST   Date Value Ref Range Status   11/28/2016 16 0 - 45 U/L Final     ALT   Date Value Ref Range Status   03/01/2019 19 0 - 70 U/L Final     Albumin Urine mg/g Cr   Date Value Ref Range Status   03/01/2019 5.61 0 - 17 mg/g Cr Final       [employer] is not working to upload. Please use [employer] for video visit.    Average morning - 139  Average lunch - 170 - 190  Average dinner - 120  Average bed - 160    Carlos A Roberts is a 50 year old male who is being evaluated via a billable video visit.      The patient has been notified of following:     \"This video visit will be conducted via a call between you and your physician/provider. We have found that certain health care needs can be provided without the need for an in-person physical exam.  This service lets us provide the care you need with a video conversation.  If a prescription is necessary we can send it directly to your pharmacy.  If lab work is needed we can place an order for that and you can then stop by our lab to have the test done at a later time.    Video visits are billed at different rates depending on your insurance coverage.  Please reach out to your insurance provider with any questions.    If during the course of the call the physician/provider feels a video visit is not appropriate, you will not be charged for this service.\"    Patient has given verbal consent for Video visit? Yes  How would you like to " obtain your AVS? MyChart  If you are dropped from the video visit, the video invite should be resent to: Text to cell phone: [phone]  Will anyone else be joining your video visit? No     Nanette Crawford [facility]  Adult Endocrinology  [facility], [place]            Again, thank you for allowing me to participate in the care of your patient.        Sincerely,        Nevin Lutz MD

## 2020-12-15 NOTE — PROGRESS NOTES
Video-Visit Details    Type of service:  Video Visit    Video call duration: 25 minutes. Ended at 11:41 am.    Originating Location (pt. Location): Home  Distant Location (provider location):  [facility]   Platform used for Video Visit: Doximity     Due to the COVID 19 pandemic this visit was converted to a video visit in order to help prevent spread of infection in this patient and the general population.     Assessment/Treatment Plan:      Carlos A Roberts is a 50 year old male:     Type 1 Diabetes with hyperglycemia:    The patient was diagnosed with diabetes at age 33.  Overall, recently, his glycemic control has been fairly good.  His diabetes is known to be complicated by mild retinopathy.    Recommendations:  I think he is going to benefit from transitioning to an insulin pump.  Recommended to schedule an appointment with the diabetes educator to review Tandem insulin pump use.   Unclear whether or not he requires a higher dose of Toujeo, given the reported spontaneous increase of the blood sugar in the morning hours   He definitely uses a more aggressive correction and this can explain some of the reported hypoglycemic episodes.  I recommended to use a correction scale of 1 unit per 40 mg above 120 or above 100.  Schedule fasting lab work: Hematocrit, CMP, urine microalbumin and A1c    Hypothyroidism S/P WHITING ablation   He takes the levothyroxine correctly.  Clinically, he appears euthyroid.   Follow-up TFTs     Hypercholesterolemia, on 40 mg atorvastatin  Follow-up lipid panel    Orders Placed This Encounter   Procedures     AMBULATORY ADULT DIABETES EDUCATOR REFERRAL     Addendum:  The patient sent the CGM readings following the visit.  Average glucose on the , corresponding to a GMI of 7.2%, with a glucose variability of 70.  A [employer] message was sent to the patient.       =====================================================  HPI: Carlos A Roberts is 50 year old male evaluated for  type 1 diabetes. He was previously seen by Dr. Mckinney and he established care with me in April 2020.  In addition to type 1 diabetes, diagnosed at age 33, the patient also has a history of hypercholesterolemia, psoriasis, Graves' disease treated with radioiodine ablation and prior smoking addiction.      Average hemoglobin A1c over the recent years has been around 8%.      Since his last visit here, he has increase the dose of Toujeo to 27 units.  He finds the tay sensor alarm very helpful.  Humalog is administered at 1 unit per 7 g carbohydrates.  In general, the first meal of the day is around 11 AM-12 PM and the second meal is around 6 AM (dinner).  The average NovoLog dose for meals is 7 to 8 units.  Quite frequently, his blood sugar is elevated already morning and he takes 4-5 units NovoLog to bring it down, as correction.  He frequently snacks after dinner, around 8-9 PM.  He has noticed a spontaneous increase in the blood sugar in the morning, in the absence of food intake, with no obvious reason.  If he does take too much NovoLog at bedtime, he sometimes develops a low blood sugar during the night.  The correction scale he has been using is more aggressive than the recommended correction of 1 per 40 above 140.    Diabetes complications:   Retinopathy: Last eye exam was in 11/2019. Was told at the last visit he has some retina changes from diabetes.    Nephropathy: No history of microalbuminuria, GFR above 90. On 25 mg losartan daily - started for kidney protection.    Neuropathy: None  Hypoglycemia: he gets the tremors, the sweating, palpitations.  The lowest BG number he remembers experiencing was 36. No LOC due to hypoglycemia. He doesn't have glucagon at home. Corrects the hypoglycemic episodes with ghulam bars or juice.  Carlos A has not experienced prior episodes of DKA.  He has been taking 40 mg atorvastatin daily.    Medications     HMG CoA Reductase Inhibitors     atorvastatin (LIPITOR) 40 MG tablet          Vitamin D     Weight  Wt Readings from Last 4 Encounters:   12/06/19 80.4 kg (177 lb 4.8 oz)   08/02/19 79.7 kg (175 lb 9.6 oz)   03/01/19 79.3 kg (174 lb 14.4 oz)   05/11/18 76.7 kg (169 lb)     Weight trend  Wt Readings from Last 4 Encounters:   12/06/19 80.4 kg (177 lb 4.8 oz)   08/02/19 79.7 kg (175 lb 9.6 oz)   03/01/19 79.3 kg (174 lb 14.4 oz)   05/11/18 76.7 kg (169 lb)       Hypothyroidism  2015 : Graves disease   WHITING ablation done in 2015.  He had lab work done in July by his primary care provider and the dose of levothyroxine was increased from 100 to 112 mcg daily.  He confirms he has been taking levothyroxine correctly, on empty stomach, hours before having anything to eat.    Past Medical History:   Diagnosis Date     Hyperlipidemia      Other and unspecified hyperlipidemia      Other psoriasis      Tobacco use disorder      Type I (juvenile type) diabetes mellitus without mention of complication, not stated as uncontrolled     Insulin dependent     Past Surgical History:   Procedure Laterality Date     APPENDECTOMY  Age 10     Allergies   Allergen Reactions     Penicillins          Current Outpatient Medications:      atorvastatin (LIPITOR) 40 MG tablet, Take 1 tablet (40 mg) by mouth daily, Disp: 90 tablet, Rfl: 3     blood glucose (ACCU-CHEK GUIDE) test strip, Use to test blood sugar 4 times daily or as directed., Disp: 400 strip, Rfl: 3     blood glucose monitoring (ACCU-CHEK FASTCLIX) lancets, Use to test blood sugar 4 times daily or as directed., Disp: 102 each, Rfl: 3     Continuous Blood Gluc  (FREESTYLE KAR 2 READER SYSTM) ELIEL, 1 Device continuous, Disp: 1 each, Rfl: 0     Continuous Blood Gluc Sensor (FREESTYLE KAR 2 SENSOR SYSTM) MISC, 1 each every 14 days, Disp: 6 each, Rfl: 3     glucagon (GLUCAGON EMERGENCY) 1 MG kit, Inject 1 mg subcutaneously or intramuscularly., Disp: 1 each, Rfl: 3     insulin glargine U-300 (TOUJEO SOLOSTAR) 300 UNIT/ML (1 units dial) pen, Inject  35 Units Subcutaneous At Bedtime, Disp: 42 mL, Rfl: 3     insulin lispro (HUMALOG KWIKPEN) 100 UNIT/ML (1 unit dial) KWIKPEN, USE AS DIRECTED FOR CARBOHYDRATE AND SLIDING SCALE COVERAGE, MAX DAILY DOSE IS 50 UNITS, Disp: 15 mL, Rfl: 3     insulin pen needle (BD QAMAR U/F) 32G X 4 MM miscellaneous, Use 4 pen needles daily or as directed., Disp: 360 each, Rfl: 3     levothyroxine (SYNTHROID/LEVOTHROID) 112 MCG tablet, Take 1 tablet by mouth daily, Disp: , Rfl:      losartan (COZAAR) 25 MG tablet, Take 1 tablet by mouth daily, Disp: , Rfl:      PARoxetine (PAXIL) 10 MG tablet, Take 1 tablet (10 mg) by mouth every morning, Disp: 30 tablet, Rfl: 0    Review of Systems   Systemic symptoms: no fatigue; weight up, less than 10 lbs       Past Medical History:   Diagnosis Date     Hyperlipidemia      Other and unspecified hyperlipidemia      Other psoriasis      Tobacco use disorder      Type I (juvenile type) diabetes mellitus without mention of complication, not stated as uncontrolled     Insulin dependent       Past Surgical History:   Procedure Laterality Date     APPENDECTOMY  Age 10       Family History   Problem Relation Age of Onset     Heart Disease Paternal Grandmother      Heart Disease Paternal Grandfather      Social History    with 2 quintenildren. Quit smoking in 2007. Former smoker for 20 years, 1/2 PPD. Denies using   Occupation: .     PE:  General appearance: he is well-developed, well-nourished, and in no distress.  Eyes: conjunctivae and extraocular motions are normal. No lid lag, no stare, no proptosis.  HENT: no visible thyromegaly   Neurologic: no resting tremor  Psychiatric: affect and judgment normal   Skin: no lesions on exposed skin     In House Labs reviewed:  Lab Results   Component Value Date    A1C 7.8 (A) 12/06/2019    A1C 7.8 (A) 08/02/2019    A1C 8.2 (A) 03/01/2019    A1C 8.5 (H) 05/11/2018    A1C 8.7 (A) 05/11/2018       Hemoglobin   Date Value Ref Range Status   11/17/2017 15.8  13.3 - 17.7 g/dL Final     Hematocrit   Date Value Ref Range Status   03/01/2019 47.0 40.0 - 53.0 % Final     Cholesterol   Date Value Ref Range Status   11/17/2017 183 <200 mg/dL Final     Cholesterol/HDL Ratio   Date Value Ref Range Status   07/22/2015 3.5 0.0 - 5.0 Final     HDL Cholesterol   Date Value Ref Range Status   11/17/2017 62 >39 mg/dL Final     LDL Cholesterol Calculated   Date Value Ref Range Status   11/17/2017 81 <100 mg/dL Final     Comment:     Desirable:       <100 mg/dl     LDL Cholesterol Direct   Date Value Ref Range Status   03/01/2019 162 (H) <100 mg/dL Final     Comment:     Above desirable:  100-129 mg/dl  Borderline High:  130-159 mg/dL  High:             160-189 mg/dL  Very high:       >189 mg/dl       VLDL-Cholesterol   Date Value Ref Range Status   07/22/2015 15 0 - 30 mg/dL Final     Triglycerides   Date Value Ref Range Status   11/17/2017 200 (H) <150 mg/dL Final     Comment:     Borderline high:  150-199 mg/dl  High:             200-499 mg/dl  Very high:       >499 mg/dl  Non Fasting       Albumin Urine mg/L   Date Value Ref Range Status   03/01/2019 8 mg/L Final     TSH   Date Value Ref Range Status   03/01/2019 3.05 0.40 - 4.00 mU/L Final         Last Basic Metabolic Panel:    Sodium   Date Value Ref Range Status   03/01/2019 137 133 - 144 mmol/L Final     Potassium   Date Value Ref Range Status   03/01/2019 4.0 3.4 - 5.3 mmol/L Final     Chloride   Date Value Ref Range Status   03/01/2019 104 94 - 109 mmol/L Final     Calcium   Date Value Ref Range Status   03/01/2019 9.7 8.5 - 10.1 mg/dL Final     Carbon Dioxide   Date Value Ref Range Status   03/01/2019 28 20 - 32 mmol/L Final     Urea Nitrogen   Date Value Ref Range Status   03/01/2019 11 7 - 30 mg/dL Final     Creatinine   Date Value Ref Range Status   03/01/2019 0.68 0.66 - 1.25 mg/dL Final     GFR Estimate   Date Value Ref Range Status   03/01/2019 >90 >60 mL/min/[1.73_m2] Final     Comment:     Non  GFR  Calc  Starting 12/18/2018, serum creatinine based estimated GFR (eGFR) will be   calculated using the Chronic Kidney Disease Epidemiology Collaboration   (CKD-EPI) equation.       Glucose   Date Value Ref Range Status   03/01/2019 175 (H) 70 - 99 mg/dL Final     Comment:     Non Fasting       AST   Date Value Ref Range Status   11/28/2016 16 0 - 45 U/L Final     ALT   Date Value Ref Range Status   03/01/2019 19 0 - 70 U/L Final     Albumin Urine mg/g Cr   Date Value Ref Range Status   03/01/2019 5.61 0 - 17 mg/g Cr Final

## 2020-12-15 NOTE — PROGRESS NOTES
"[facility] is not working to upload. Please use [employer] for video visit.    Average morning - 139  Average lunch - 170 - 190  Average dinner - 120  Average bed - 160    Carlos A Roberts is a 50 year old male who is being evaluated via a billable video visit.      The patient has been notified of following:     \"This video visit will be conducted via a call between you and your physician/provider. We have found that certain health care needs can be provided without the need for an in-person physical exam.  This service lets us provide the care you need with a video conversation.  If a prescription is necessary we can send it directly to your pharmacy.  If lab work is needed we can place an order for that and you can then stop by our lab to have the test done at a later time.    Video visits are billed at different rates depending on your insurance coverage.  Please reach out to your insurance provider with any questions.    If during the course of the call the physician/provider feels a video visit is not appropriate, you will not be charged for this service.\"    Patient has given verbal consent for Video visit? Yes  How would you like to obtain your AVS? MyChart  If you are dropped from the video visit, the video invite should be resent to: Text to cell phone: [phone]  Will anyone else be joining your video visit? No     Nanette Crawford CMA  Adult Endocrinology  [facility]        "

## 2021-05-29 ENCOUNTER — HEALTH MAINTENANCE LETTER (OUTPATIENT)
Age: 51
End: 2021-05-29

## 2021-09-18 ENCOUNTER — HEALTH MAINTENANCE LETTER (OUTPATIENT)
Age: 51
End: 2021-09-18

## 2021-12-22 DIAGNOSIS — E10.65 TYPE 1 DIABETES MELLITUS WITH HYPERGLYCEMIA (H): ICD-10-CM

## 2021-12-23 NOTE — TELEPHONE ENCOUNTER
Continuous Blood Gluc Sensor (FREESTYLE KAR 2 SENSOR SYSTM) [facility] [place]    12/15/2020  [facility] Nevin Reid MD    Endocrinology, Diabetes, and Metabolism     Nv:none    Scheduling has been notified to contact the pt for appointment.

## 2021-12-23 NOTE — TELEPHONE ENCOUNTER
12/23 Called and spoke to patient. He is currently scheduled for follow up.     [facility]   Orthopedics, Podiatry, Sports Medicine, ENT/Eye Specialties  [facility]   [phone]

## 2022-01-08 ENCOUNTER — HEALTH MAINTENANCE LETTER (OUTPATIENT)
Age: 52
End: 2022-01-08

## 2022-08-20 ENCOUNTER — HEALTH MAINTENANCE LETTER (OUTPATIENT)
Age: 52
End: 2022-08-20

## 2022-11-20 ENCOUNTER — HEALTH MAINTENANCE LETTER (OUTPATIENT)
Age: 52
End: 2022-11-20

## 2023-04-15 ENCOUNTER — HEALTH MAINTENANCE LETTER (OUTPATIENT)
Age: 53
End: 2023-04-15

## 2023-11-25 ENCOUNTER — HEALTH MAINTENANCE LETTER (OUTPATIENT)
Age: 53
End: 2023-11-25

## 2024-06-16 ENCOUNTER — HEALTH MAINTENANCE LETTER (OUTPATIENT)
Age: 54
End: 2024-06-16